# Patient Record
Sex: MALE | Race: BLACK OR AFRICAN AMERICAN | Employment: FULL TIME | URBAN - METROPOLITAN AREA
[De-identification: names, ages, dates, MRNs, and addresses within clinical notes are randomized per-mention and may not be internally consistent; named-entity substitution may affect disease eponyms.]

---

## 2017-06-09 ENCOUNTER — OFFICE VISIT (OUTPATIENT)
Dept: FAMILY MEDICINE CLINIC | Age: 52
End: 2017-06-09

## 2017-06-09 VITALS
DIASTOLIC BLOOD PRESSURE: 108 MMHG | HEIGHT: 67 IN | OXYGEN SATURATION: 98 % | TEMPERATURE: 98 F | RESPIRATION RATE: 18 BRPM | HEART RATE: 79 BPM | WEIGHT: 221.8 LBS | SYSTOLIC BLOOD PRESSURE: 178 MMHG | BODY MASS INDEX: 34.81 KG/M2

## 2017-06-09 DIAGNOSIS — E78.00 HYPERCHOLESTEROLEMIA: ICD-10-CM

## 2017-06-09 DIAGNOSIS — R06.83 SNORING: ICD-10-CM

## 2017-06-09 DIAGNOSIS — Z23 ENCOUNTER FOR IMMUNIZATION: ICD-10-CM

## 2017-06-09 DIAGNOSIS — I10 ESSENTIAL HYPERTENSION: Primary | ICD-10-CM

## 2017-06-09 PROBLEM — R73.01 IMPAIRED FASTING GLUCOSE: Status: ACTIVE | Noted: 2017-06-09

## 2017-06-09 RX ORDER — AMLODIPINE BESYLATE 5 MG/1
5 TABLET ORAL
Qty: 90 TAB | Refills: 4 | Status: SHIPPED | OUTPATIENT
Start: 2017-06-09 | End: 2018-10-06 | Stop reason: SDUPTHER

## 2017-06-09 RX ORDER — ATORVASTATIN CALCIUM 40 MG/1
40 TABLET, FILM COATED ORAL
Qty: 90 TAB | Refills: 4 | Status: SHIPPED | OUTPATIENT
Start: 2017-06-09 | End: 2018-10-06 | Stop reason: SDUPTHER

## 2017-06-09 RX ORDER — CARVEDILOL 25 MG/1
25 TABLET ORAL 2 TIMES DAILY WITH MEALS
Qty: 180 TAB | Refills: 4 | Status: SHIPPED | OUTPATIENT
Start: 2017-06-09 | End: 2018-10-06 | Stop reason: SDUPTHER

## 2017-06-09 RX ORDER — LOSARTAN POTASSIUM AND HYDROCHLOROTHIAZIDE 25; 100 MG/1; MG/1
1 TABLET ORAL DAILY
Qty: 90 TAB | Refills: 4 | Status: SHIPPED | OUTPATIENT
Start: 2017-06-09 | End: 2018-10-06 | Stop reason: SDUPTHER

## 2017-06-09 NOTE — PATIENT INSTRUCTIONS

## 2017-06-09 NOTE — MR AVS SNAPSHOT
Visit Information Date & Time Provider Department Dept. Phone Encounter #  
 6/9/2017  2:45 PM Alexandre Celaya, [address] [place] [address] [phone] [id number] Follow-up Instructions Return in about 4 weeks (around 7/7/2017) for physical, labs. Upcoming Health Maintenance Date Due FOBT Q 1 YEAR AGE 50-75 4/28/2016 INFLUENZA AGE 9 TO ADULT 8/1/2017 DTaP/Tdap/Td series (2 - Td) 4/22/2026 Allergies as of 6/9/2017  Review Complete On: 6/9/2017 By: Alexandre Celaya MD  
  
 Severity Noted Reaction Type Reactions Ace Inhibitors  02/22/2013    Cough ED Current Immunizations  Reviewed on 4/22/2016 Name Date Pneumococcal Polysaccharide (PPSV-23)  Incomplete Tdap 4/22/2016 Not reviewed this visit You Were Diagnosed With   
  
 Codes Comments Essential hypertension    -  Primary ICD-10-CM: I10 
ICD-9-CM: 401.9 Hypercholesterolemia     ICD-10-CM: E78.00 ICD-9-CM: 272.0 Cardiomyopathy, unspecified type     ICD-10-CM: I42.9 ICD-9-CM: 425.4 LBBB (left bundle branch block)     ICD-10-CM: I44.7 ICD-9-CM: 426.3 Encounter for immunization     ICD-10-CM: U28 ICD-9-CM: V03.89 Snoring     ICD-10-CM: R06.83 
ICD-9-CM: 786.09 Vitals BP Pulse Temp Resp Height(growth percentile) Weight(growth percentile) (!) 178/108 (BP 1 Location: Right arm, BP Patient Position: Sitting) 79 98 °F (36.7 °C) (Oral) 18 5' 7\" (1.702 m) 221 lb 12.8 oz (100.6 kg) SpO2 BMI Smoking Status 98% 34.74 kg/m2 Never Smoker Vitals History BMI and BSA Data Body Mass Index Body Surface Area 34.74 kg/m 2 2.18 m 2 Preferred Pharmacy Pharmacy Name Phone [facility] [facility], [address] [place] [address],[address] [phone] Your Updated Medication List  
  
   
This list is accurate as of: 6/9/17  3:50 PM.  Always use your most recent med list.  
  
  
  
  
 amLODIPine 5 mg tablet Commonly known as:  Judith Dural Take 1 Tab by mouth nightly. atorvastatin 40 mg tablet Commonly known as:  LIPITOR Take 1 Tab by mouth nightly. carvedilol 25 mg tablet Commonly known as:  Jeanella Meline Take 1 Tab by mouth two (2) times daily (with meals). losartan-hydroCHLOROthiazide 100-25 mg per tablet Commonly known as:  HYZAAR Take 1 Tab by mouth daily. multivitamin tablet Commonly known as:  ONE A DAY Take 1 Tab by mouth daily. Prescriptions Sent to Pharmacy Refills  
 amLODIPine (NORVASC) 5 mg tablet 4 Sig: Take 1 Tab by mouth nightly. Class: Normal  
 Pharmacy: [facility] [address] [place] [address] Ph #: [phone] Route: Oral  
 carvedilol (COREG) 25 mg tablet 4 Sig: Take 1 Tab by mouth two (2) times daily (with meals). Class: Normal  
 Pharmacy: [facility] [address] [place] [address] Ph #: [phone] Route: Oral  
 losartan-hydroCHLOROthiazide (HYZAAR) 100-25 mg per tablet 4 Sig: Take 1 Tab by mouth daily. Class: Normal  
 Pharmacy: [facility] [address] [place] [address] Ph #: [phone] Route: Oral  
 atorvastatin (LIPITOR) 40 mg tablet 4 Sig: Take 1 Tab by mouth nightly. Class: Normal  
 Pharmacy: [facility] [address] [place] [address] Ph #: [phone] Route: Oral  
  
We Performed the Following PNEUMOCOCCAL POLYSACCHARIDE VACCINE, 23-VALENT, ADULT OR IMMUNOSUPPRESSED PT DOSE, [43096 CPT(R)] REFERRAL TO SLEEP STUDIES [REF99 Custom] Comments:  
 Please evaluate patient for snoring, hypertension, would like him to have sleep study. Follow-up Instructions Return in about 4 weeks (around 7/7/2017) for physical, labs. Referral Information  Referral ID Referred By Referred To  
  
 [id number] Daniel Sanchez MD   
 [address] [place] [address] Janeen Melvin Phone: [phone] Fax: [phone] Visits Status Start Date End Date 1 New Request 6/9/17 6/9/18 If your referral has a status of pending review or denied, additional information will be sent to support the outcome of this decision. Patient Instructions DASH Diet: Care Instructions Your Care Instructions The DASH diet is an eating plan that can help lower your blood pressure. DASH stands for Dietary Approaches to Stop Hypertension. Hypertension is high blood pressure. The DASH diet focuses on eating foods that are high in calcium, potassium, and magnesium. These nutrients can lower blood pressure. The foods that are highest in these nutrients are fruits, vegetables, low-fat dairy products, nuts, seeds, and legumes. But taking calcium, potassium, and magnesium supplements instead of eating foods that are high in those nutrients does not have the same effect. The DASH diet also includes whole grains, fish, and poultry. The DASH diet is one of several lifestyle changes your doctor may recommend to lower your high blood pressure. Your doctor may also want you to decrease the amount of sodium in your diet. Lowering sodium while following the DASH diet can lower blood pressure even further than just the DASH diet alone. Follow-up care is a key part of your treatment and safety. Be sure to make and go to all appointments, and call your doctor if you are having problems. It's also a good idea to know your test results and keep a list of the medicines you take. How can you care for yourself at home? Following the DASH diet · Eat 4 to 5 servings of fruit each day. A serving is 1 medium-sized piece of fruit, ½ cup chopped or canned fruit, 1/4 cup dried fruit, or 4 ounces (½ cup) of fruit juice. Choose fruit more often than fruit juice. · Eat 4 to 5 servings of vegetables each day.  A serving is 1 cup of lettuce or raw leafy vegetables, ½ cup of chopped or cooked vegetables, or 4 ounces (½ cup) of vegetable juice. Choose vegetables more often than vegetable juice. · Get 2 to 3 servings of low-fat and fat-free dairy each day. A serving is 8 ounces of milk, 1 cup of yogurt, or 1 ½ ounces of cheese. · Eat 6 to 8 servings of grains each day. A serving is 1 slice of bread, 1 ounce of dry cereal, or ½ cup of cooked rice, pasta, or cooked cereal. Try to choose whole-grain products as much as possible. · Limit lean meat, poultry, and fish to 2 servings each day. A serving is 3 ounces, about the size of a deck of cards. · Eat 4 to 5 servings of nuts, seeds, and legumes (cooked dried beans, lentils, and split peas) each week. A serving is 1/3 cup of nuts, 2 tablespoons of seeds, or ½ cup of cooked beans or peas. · Limit fats and oils to 2 to 3 servings each day. A serving is 1 teaspoon of vegetable oil or 2 tablespoons of salad dressing. · Limit sweets and added sugars to 5 servings or less a week. A serving is 1 tablespoon jelly or jam, ½ cup sorbet, or 1 cup of lemonade. · Eat less than 2,300 milligrams (mg) of sodium a day. If you limit your sodium to 1,500 mg a day, you can lower your blood pressure even more. Tips for success · Start small. Do not try to make dramatic changes to your diet all at once. You might feel that you are missing out on your favorite foods and then be more likely to not follow the plan. Make small changes, and stick with them. Once those changes become habit, add a few more changes. · Try some of the following: ¨ Make it a goal to eat a fruit or vegetable at every meal and at snacks. This will make it easy to get the recommended amount of fruits and vegetables each day. ¨ Try yogurt topped with fruit and nuts for a snack or healthy dessert. ¨ Add lettuce, tomato, cucumber, and onion to sandwiches.  
¨ Combine a ready-made pizza crust with low-fat mozzarella cheese and lots of vegetable toppings. Try using tomatoes, squash, spinach, broccoli, carrots, cauliflower, and onions. ¨ Have a variety of cut-up vegetables with a low-fat dip as an appetizer instead of chips and dip. ¨ Sprinkle sunflower seeds or chopped almonds over salads. Or try adding chopped walnuts or almonds to cooked vegetables. ¨ Try some vegetarian meals using beans and peas. Add garbanzo or kidney beans to salads. Make burritos and tacos with mashed tripathi beans or black beans. Where can you learn more? Go to http://josuéUnidymmartine.info/. Enter D145 in the search box to learn more about \"DASH Diet: Care Instructions. \" Current as of: March 23, 2016 Content Version: 11.2 © 5739-1179 GRID. Care instructions adapted under license by [employer] (which disclaims liability or warranty for this information). If you have questions about a medical condition or this instruction, always ask your healthcare professional. [address] any warranty or liability for your use of this information. Introducing [facility]! Grace Olmos introduces [employer] patient portal. Now you can access parts of your medical record, email your doctor's office, and request medication refills online. 1. In your internet browser, go to [email or web address] 2. Click on the First Time User? Click Here link in the Sign In box. You will see the New Member Sign Up page. 3. Enter your Smart[employer] Access Code exactly as it appears below. You will not need to use this code after youve completed the sign-up process. If you do not sign up before the expiration date, you must request a new code. · Smart[employer] Access Code: [email or web address] Expires: 9/7/2017  3:48 PM 
 
4. Enter the last four digits of your Social Security Number (xxxx) and Date of Birth (mm/dd/yyyy) as indicated and click Submit. You will be taken to the next sign-up page. 5. Create a [employer] ID. This will be your [employer] login ID and cannot be changed, so think of one that is secure and easy to remember. 6. Create a [employer] password. You can change your password at any time. 7. Enter your Password Reset Question and Answer. This can be used at a later time if you forget your password. 8. Enter your e-mail address. You will receive e-mail notification when new information is available in [address] 9. Click Sign Up. You can now view and download portions of your medical record. 10. Click the Download Summary menu link to download a portable copy of your medical information. If you have questions, please visit the Frequently Asked Questions section of the [employer] website. Remember, [employer] is NOT to be used for urgent needs. For medical emergencies, dial 911. Now available from your iPhone and Android! Please provide this summary of care documentation to your next provider. Your primary care clinician is listed as Bryce Jama. If you have any questions after today's visit, please call [phone].

## 2017-06-09 NOTE — PROGRESS NOTES
Chief Complaint   Patient presents with    Hypertension     follow up, asking about need for going back on medication    Cholesterol Problem     follow up       Reviewed Record in preparation for visit and have obtained necessary documentation. Identified pt with two pt identifiers (Name @ )    Health Maintenance Due   Topic    Pneumococcal 19-64 Medium Risk (1 of 1 - PPSV23)    FOBT Q 1 YEAR AGE 50-75          1. Have you been to the ER, urgent care clinic since your last visit? Hospitalized since your last visit? No    2. Have you seen or consulted any other health care providers outside of the [address] [place] [address] since your last visit? Include any pap smears or colon screening.  No

## 2017-06-10 NOTE — PROGRESS NOTES
Elmer Milan [address] [facility], [address]  [phone]             Date of visit: 6/9/2017   Subjective:      History obtained from:  the patient. Juju Diez is a 47 y.o. male who presents today for feeling fine, just wants to get back on meds. , was on 3 bp pills plus pravastatin. Had CHF with dilated cardiomyopathy EF 20% several years ago when BP was high, but that resolved with treatment of blood pressure. Denies any cardiac symptoms now. Did try to get back into exercise and felt more short of breath, like he needed his meds. They ran out a few months ago. Has never been checked for ROGE. Admits that grandson has heard him snoring and that he sometimes has somnolence. Patient Active Problem List    Diagnosis Date Noted    Impaired fasting glucose 06/09/2017    Cardiomyopathy ([address] 03/18/2013    LBBB (left bundle branch block) 03/04/2013    Essential hypertension, malignant 03/04/2013    Hypertension     Hypercholesterolemia      Current Outpatient Prescriptions   Medication Sig Dispense Refill    amLODIPine (NORVASC) 5 mg tablet Take 1 Tab by mouth nightly. 90 Tab 4    carvedilol (COREG) 25 mg tablet Take 1 Tab by mouth two (2) times daily (with meals). 180 Tab 4    losartan-hydroCHLOROthiazide (HYZAAR) 100-25 mg per tablet Take 1 Tab by mouth daily. 90 Tab 4    atorvastatin (LIPITOR) 40 mg tablet Take 1 Tab by mouth nightly. 90 Tab 4    multivitamin (ONE A DAY) tablet Take 1 Tab by mouth daily.        Allergies   Allergen Reactions    Ace Inhibitors Cough     ED     Past Medical History:   Diagnosis Date    Hypercholesteremia     Hypertension     Obesity      Past Surgical History:   Procedure Laterality Date    HX TONSILLECTOMY       Family History   Problem Relation Age of Onset    Kidney Disease Mother     Hypertension Mother     Lung Disease Father      lung cancer    Hypertension Maternal Grandmother     Diabetes Maternal Grandmother Social History   Substance Use Topics    Smoking status: Never Smoker    Smokeless tobacco: Former User     Types: Chew     Quit date: 1/1/2016    Alcohol use Yes      Comment: occasional-twice a month      Social History     Social History Narrative    ** Merged History Encounter **             Review of Systems  Card: denies chest pain or palpitations  Pulm: denies shortness of breath other than mild during new exercise      Objective:     Vitals:    06/09/17 1511 06/09/17 1520   BP: (!) 180/104 (!) 178/108   Pulse: 79    Resp: 18    Temp: 98 °F (36.7 °C)    TempSrc: Oral    SpO2: 98%    Weight: 221 lb 12.8 oz (100.6 kg)    Height: 5' 7\" (1.702 m)      Body mass index is 34.74 kg/(m^2). General: stated age, obese and in NAD  Neck: supple, symmetrical, trachea midline, no adenopathy and thyroid: not enlarged, symmetric, no tenderness/mass/nodules  Lungs:  clear to auscultation w/o rales, rhonchi, wheezes w/normal effort and no use of accessory muscles of respiration   Heart: regular rate and rhythm, S1, S2 normal, no murmur, click, rub or gallop  Abdomen: soft, nontender, no masses, BS normal  Ext:  No edema noted.    Lymph: no cervical adenopathy appreciated  Skin:  Normal. and no rash or abnormalities   Psych: alert and oriented to person, place, time and situation and Speech: appropriate quality, quantity and organization of sentences     Lab Results   Component Value Date/Time    Cholesterol, total 316 05/16/2014 09:10 AM    HDL Cholesterol 54 05/16/2014 09:10 AM    LDL, calculated 191 05/16/2014 09:10 AM    VLDL, calculated 71 05/16/2014 09:10 AM    Triglyceride 353 05/16/2014 09:10 AM     Lab Results   Component Value Date/Time    Sodium 140 05/16/2014 09:10 AM    Potassium 3.7 05/16/2014 09:10 AM    Chloride 101 05/16/2014 09:10 AM    CO2 26 05/16/2014 09:10 AM    Anion gap 9 11/25/2011 06:50 PM    Glucose 115 05/16/2014 09:10 AM    BUN 10 05/16/2014 09:10 AM    Creatinine 0.85 05/16/2014 09:10 AM BUN/Creatinine ratio 12 05/16/2014 09:10 AM    GFR est  05/16/2014 09:10 AM    GFR est non- 05/16/2014 09:10 AM    Calcium 9.2 05/16/2014 09:10 AM    Bilirubin, total 0.3 05/16/2014 09:10 AM    AST (SGOT) 18 05/16/2014 09:10 AM    Alk. phosphatase 43 05/16/2014 09:10 AM    Protein, total 6.8 05/16/2014 09:10 AM    Albumin 4.0 05/16/2014 09:10 AM    Globulin 4.5 11/25/2011 06:50 PM    A-G Ratio 1.4 05/16/2014 09:10 AM    ALT (SGPT) 14 05/16/2014 09:10 AM     Lab Results   Component Value Date/Time    WBC 5.1 05/16/2014 09:10 AM    HGB 13.6 05/16/2014 09:10 AM    HCT 39.4 05/16/2014 09:10 AM    PLATELET 177 32/83/9130 09:10 AM    MCV 84 05/16/2014 09:10 AM     Lab Results   Component Value Date/Time    TSH 2.820 05/16/2014 09:10 AM    TSH 1.520 02/22/2013 12:01 PM         Assessment/Plan:       ICD-10-CM ICD-9-CM    1. Essential hypertension I10 401.9 amLODIPine (NORVASC) 5 mg tablet      carvedilol (COREG) 25 mg tablet      losartan-hydroCHLOROthiazide (HYZAAR) 100-25 mg per tablet      REFERRAL TO SLEEP STUDIES   2. Hypercholesterolemia E78.00 272.0    3. Snoring R06.83 786.09 REFERRAL TO SLEEP STUDIES   4.  Encounter for immunization Z23 V03.89 PNEUMOCOCCAL POLYSACCHARIDE VACCINE, 23-VALENT, ADULT OR IMMUNOSUPPRESSED PT DOSE,       Orders Placed This Encounter    Pneumococcal polysaccharide vaccine, 23-valent, adult or immunosuppressed pt dose    REFERRAL TO SLEEP STUDIES    amLODIPine (NORVASC) 5 mg tablet    carvedilol (COREG) 25 mg tablet    losartan-hydroCHLOROthiazide (HYZAAR) 100-25 mg per tablet    atorvastatin (LIPITOR) 40 mg tablet     Get back on bp meds  Told him ok to do gradually, however he has started them all at once before without side effects    Advised changing pravastatin to lipitor for most cardiac protection    Refer sleep study as I think ROGE is a possibility and he is willing to get tested    encoruaged regular follow up; don't want him to ever run out of his meds again  He wants to delay labs and would probably best to do them when on his meds anyway, asked him to come in 1 mo for CPE    Discussed the diagnosis and plan and he expressed understanding. Follow-up Disposition:  Return in about 4 weeks (around 7/7/2017) for physical, labs.     Alexandre Celaya MD

## 2018-10-24 ENCOUNTER — OFFICE VISIT (OUTPATIENT)
Dept: FAMILY MEDICINE CLINIC | Age: 53
End: 2018-10-24

## 2018-10-24 VITALS
BODY MASS INDEX: 35.53 KG/M2 | RESPIRATION RATE: 16 BRPM | WEIGHT: 226.4 LBS | HEIGHT: 67 IN | DIASTOLIC BLOOD PRESSURE: 120 MMHG | SYSTOLIC BLOOD PRESSURE: 186 MMHG | TEMPERATURE: 98.2 F | OXYGEN SATURATION: 97 % | HEART RATE: 80 BPM

## 2018-10-24 DIAGNOSIS — E78.5 HYPERLIPIDEMIA LDL GOAL <130: ICD-10-CM

## 2018-10-24 DIAGNOSIS — I10 ESSENTIAL HYPERTENSION: Primary | ICD-10-CM

## 2018-10-24 DIAGNOSIS — Z12.11 COLON CANCER SCREENING: ICD-10-CM

## 2018-10-24 PROBLEM — E66.01 SEVERE OBESITY (HCC): Status: ACTIVE | Noted: 2018-10-24

## 2018-10-24 PROBLEM — R79.89 ABNORMAL CBC: Status: ACTIVE | Noted: 2018-10-24

## 2018-10-24 RX ORDER — ASPIRIN 81 MG/1
81 TABLET ORAL DAILY
Qty: 30 TAB | Refills: 5 | Status: SHIPPED | OUTPATIENT
Start: 2018-10-24

## 2018-10-24 NOTE — LETTER
10/26/2018 7:03 AM 
 
Mr. Madelaine Estevez [address]  [phone] [place] [address] Dear Madelaine Estevez: 
 
Please find your most recent results below. Resulted Orders METABOLIC PANEL, COMPREHENSIVE Result Value Ref Range Glucose 103 (H) 65 - 99 mg/dL BUN 11 6 - 24 mg/dL Creatinine 1.02 0.76 - 1.27 mg/dL GFR est non-AA 84 >59 mL/min/1.73 GFR est AA 97 >59 mL/min/1.73  
 BUN/Creatinine ratio 11 9 - 20 Sodium 141 134 - 144 mmol/L Potassium 4.0 3.5 - 5.2 mmol/L Chloride 99 96 - 106 mmol/L  
 CO2 25 20 - 29 mmol/L Calcium 9.5 8.7 - 10.2 mg/dL Protein, total 7.7 6.0 - 8.5 g/dL Albumin 4.6 3.5 - 5.5 g/dL GLOBULIN, TOTAL 3.1 1.5 - 4.5 g/dL A-G Ratio 1.5 1.2 - 2.2 Bilirubin, total 0.5 0.0 - 1.2 mg/dL Alk. phosphatase 54 39 - 117 IU/L  
 AST (SGOT) 22 0 - 40 IU/L  
 ALT (SGPT) 22 0 - 44 IU/L Narrative Performed at:  [address]  [id number] : Dinorah Lamb MD, Phone:  [phone] LIPID PANEL Result Value Ref Range Cholesterol, total 391 (H) 100 - 199 mg/dL Triglyceride 223 (H) 0 - 149 mg/dL HDL Cholesterol 64 >39 mg/dL VLDL, calculated 45 (H) 5 - 40 mg/dL LDL, calculated 282 (H) 0 - 99 mg/dL Comment Comment Comment:  
   Possible Familial Hypercholesterolemia. FH should be suspected when 
fasting LDL cholesterol is above 189 mg/dL or non-HDL cholesterol 
is above 219 mg/dL. A family history of high cholesterol and heart 
disease in 1st degree relatives should be collected. J Clin Lipidol 8815;7:757-962 Narrative Performed at:  [address]  [id number] : Dinorah Lamb MD, Phone:  [phone] CBC W/O DIFF Result Value Ref Range WBC 6.0 3.4 - 10.8 x10E3/uL  
 RBC 5.23 4. 14 - 5.80 x10E6/uL HGB 15.0 13.0 - 17.7 g/dL HCT 44.3 37.5 - 51.0 %  MCV 85 79 - 97 fL  
 MCH 28.7 26.6 - 33.0 pg  
 MCHC 33.9 31.5 - 35.7 g/dL  
 RDW 14.6 12.3 - 15.4 % PLATELET 740 931 - 801 x10E3/uL Narrative Performed at:  [address]  [id number] : Henry Viera MD, Phone:  [phone] CVD REPORT Result Value Ref Range INTERPRETATION Note Comment:  
   Supplemental report is available. Narrative Performed at:  [address] [place] [address] [address] [place] [address]  [id number] : Edgard Fournier MD, Phone:  [phone] Your cholesterol is very high RECOMMENDATIONS: 
Please resume taking your Lipitor 40 mg at night Watch your diet and exercise Follow up in three months for fasting office visit and blood work Please call me if you have any questions: [phone] Sincerely, Regi Catalan NP

## 2018-10-24 NOTE — PROGRESS NOTES
HISTORY OF PRESENT ILLNESS  Fanny Aguilar is a 47 y.o. male. HPI: Cardiovascular Review  The patient has hypertension, hyperlipidemia and obesity. He reports not taking medications regularly as instructed. his blood pressure is elevated today, reports that ran out of all of his medications two weeks ago. Diet and Lifestyle: not attempting to follow a low fat, low cholesterol diet, not attempting to follow a low sodium diet, no formal exercise but active during the day. BMI: 35.46: he is not watching his diet, he is not exercising  Due for colonoscopy  Past Medical History:   Diagnosis Date    Hypercholesteremia     Hypertension     Obesity      Past Surgical History:   Procedure Laterality Date    HX TONSILLECTOMY       Allergies   Allergen Reactions    Ace Inhibitors Cough     ED     Current Outpatient Medications:     aspirin delayed-release 81 mg tablet, Take 1 Tab by mouth daily. , Disp: 30 Tab, Rfl: 5    atorvastatin (LIPITOR) 40 mg tablet, TAKE ONE TABLET BY MOUTH NIGHTLY, Disp: 30 Tab, Rfl: 0    carvedilol (COREG) 25 mg tablet, TAKE ONE TABLET BY MOUTH TWICE DAILY WITH MEALS, Disp: 60 Tab, Rfl: 0    losartan-hydroCHLOROthiazide (HYZAAR) 100-25 mg per tablet, TAKE ONE TABLET BY MOUTH ONCE DAILY, Disp: 30 Tab, Rfl: 0    amLODIPine (NORVASC) 5 mg tablet, Take 1 Tab by mouth nightly. NEEDS APPT, Disp: 30 Tab, Rfl: 0    multivitamin (ONE A DAY) tablet, Take 1 Tab by mouth daily. , Disp: , Rfl:   Review of Systems   Constitutional: Negative. Respiratory: Negative. Cardiovascular: Negative. Gastrointestinal: Negative. Blood pressure (!) 186/120, pulse 80, temperature 98.2 °F (36.8 °C), temperature source Oral, resp. rate 16, height 5' 7\" (1.702 m), weight 226 lb 6.4 oz (102.7 kg), SpO2 97 %. Body mass index is 35.46 kg/m². Physical Exam   Constitutional: No distress. HENT:   Mouth/Throat: Oropharynx is clear and moist.   Neck: Normal range of motion. Neck supple.    Cardiovascular: Normal rate and regular rhythm. No murmur heard. Pulmonary/Chest: Effort normal and breath sounds normal.   Abdominal: Soft. Bowel sounds are normal.   Skin: Skin is warm and dry. Nursing note and vitals reviewed. ASSESSMENT and PLAN  Diagnoses and all orders for this visit:    1. Essential hypertension not controlled   -     METABOLIC PANEL, COMPREHENSIVE  -     CBC W/O DIFF    2. Colon cancer screening  -     REFERRAL TO GASTROENTEROLOGY    3. Hyperlipidemia LDL goal <130  -     LIPID PANEL    4. BMI 35.0-35.9,adult      Normal BMI discussed,advised to watch diet and exercise  Other orders  -     aspirin delayed-release 81 mg tablet; Take 1 Tab by mouth daily.   Patient advised to  his medication and go home for the rest the day  Follow up in 2 weeks, he would like to come on Thursday and thus made appointment to see DR Rowan Kenny to recheck his blood pressure  Pt was given an after visit summary which includes diagnosis, current medicines and vital and voiced understanding of treatment plan

## 2018-10-24 NOTE — PROGRESS NOTES
Chief Complaint   Patient presents with    Hypertension     Follow up.  Abdominal Pain     For about 1 week, off and on     1. Have you been to the ER, urgent care clinic since your last visit? Hospitalized since your last visit? No    2. Have you seen or consulted any other health care providers outside of the [address] [place] [address] since your last visit? Include any pap smears or colon screening.  No

## 2018-10-25 LAB
ALBUMIN SERPL-MCNC: 4.6 G/DL (ref 3.5–5.5)
ALBUMIN/GLOB SERPL: 1.5 {RATIO} (ref 1.2–2.2)
ALP SERPL-CCNC: 54 IU/L (ref 39–117)
ALT SERPL-CCNC: 22 IU/L (ref 0–44)
AST SERPL-CCNC: 22 IU/L (ref 0–40)
BILIRUB SERPL-MCNC: 0.5 MG/DL (ref 0–1.2)
BUN SERPL-MCNC: 11 MG/DL (ref 6–24)
BUN/CREAT SERPL: 11 (ref 9–20)
CALCIUM SERPL-MCNC: 9.5 MG/DL (ref 8.7–10.2)
CHLORIDE SERPL-SCNC: 99 MMOL/L (ref 96–106)
CHOLEST SERPL-MCNC: 391 MG/DL (ref 100–199)
CO2 SERPL-SCNC: 25 MMOL/L (ref 20–29)
COMMENT, 011824: ABNORMAL
CREAT SERPL-MCNC: 1.02 MG/DL (ref 0.76–1.27)
ERYTHROCYTE [DISTWIDTH] IN BLOOD BY AUTOMATED COUNT: 14.6 % (ref 12.3–15.4)
GLOBULIN SER CALC-MCNC: 3.1 G/DL (ref 1.5–4.5)
GLUCOSE SERPL-MCNC: 103 MG/DL (ref 65–99)
HCT VFR BLD AUTO: 44.3 % (ref 37.5–51)
HDLC SERPL-MCNC: 64 MG/DL
HGB BLD-MCNC: 15 G/DL (ref 13–17.7)
INTERPRETATION, 910389: NORMAL
LDLC SERPL CALC-MCNC: 282 MG/DL (ref 0–99)
MCH RBC QN AUTO: 28.7 PG (ref 26.6–33)
MCHC RBC AUTO-ENTMCNC: 33.9 G/DL (ref 31.5–35.7)
MCV RBC AUTO: 85 FL (ref 79–97)
PLATELET # BLD AUTO: 243 X10E3/UL (ref 150–379)
POTASSIUM SERPL-SCNC: 4 MMOL/L (ref 3.5–5.2)
PROT SERPL-MCNC: 7.7 G/DL (ref 6–8.5)
RBC # BLD AUTO: 5.23 X10E6/UL (ref 4.14–5.8)
SODIUM SERPL-SCNC: 141 MMOL/L (ref 134–144)
TRIGL SERPL-MCNC: 223 MG/DL (ref 0–149)
VLDLC SERPL CALC-MCNC: 45 MG/DL (ref 5–40)
WBC # BLD AUTO: 6 X10E3/UL (ref 3.4–10.8)

## 2018-11-15 ENCOUNTER — OFFICE VISIT (OUTPATIENT)
Dept: FAMILY MEDICINE CLINIC | Age: 53
End: 2018-11-15

## 2018-11-15 VITALS
SYSTOLIC BLOOD PRESSURE: 160 MMHG | HEART RATE: 89 BPM | HEIGHT: 67 IN | RESPIRATION RATE: 16 BRPM | BODY MASS INDEX: 36.13 KG/M2 | DIASTOLIC BLOOD PRESSURE: 96 MMHG | OXYGEN SATURATION: 95 % | TEMPERATURE: 97.4 F | WEIGHT: 230.2 LBS

## 2018-11-15 DIAGNOSIS — E66.01 SEVERE OBESITY (HCC): ICD-10-CM

## 2018-11-15 DIAGNOSIS — I10 ESSENTIAL HYPERTENSION: ICD-10-CM

## 2018-11-15 DIAGNOSIS — I10 ESSENTIAL HYPERTENSION, MALIGNANT: Primary | ICD-10-CM

## 2018-11-15 DIAGNOSIS — I42.9 CARDIOMYOPATHY, UNSPECIFIED TYPE (HCC): ICD-10-CM

## 2018-11-15 DIAGNOSIS — E78.00 HYPERCHOLESTEROLEMIA: ICD-10-CM

## 2018-11-15 DIAGNOSIS — R73.01 IMPAIRED FASTING GLUCOSE: ICD-10-CM

## 2018-11-15 RX ORDER — AMLODIPINE BESYLATE 10 MG/1
10 TABLET ORAL DAILY
Qty: 90 TAB | Refills: 1 | Status: SHIPPED | OUTPATIENT
Start: 2018-11-15 | End: 2019-05-17 | Stop reason: SDUPTHER

## 2018-11-15 RX ORDER — LOSARTAN POTASSIUM AND HYDROCHLOROTHIAZIDE 25; 100 MG/1; MG/1
TABLET ORAL
Qty: 90 TAB | Refills: 1 | Status: SHIPPED | OUTPATIENT
Start: 2018-11-15 | End: 2019-05-17 | Stop reason: SDUPTHER

## 2018-11-15 RX ORDER — ATORVASTATIN CALCIUM 40 MG/1
TABLET, FILM COATED ORAL
Qty: 90 TAB | Refills: 1 | Status: SHIPPED | OUTPATIENT
Start: 2018-11-15 | End: 2019-05-17 | Stop reason: SDUPTHER

## 2018-11-15 RX ORDER — CARVEDILOL 25 MG/1
TABLET ORAL
Qty: 180 TAB | Refills: 1 | Status: SHIPPED | OUTPATIENT
Start: 2018-11-15 | End: 2019-05-17 | Stop reason: SDUPTHER

## 2018-11-15 NOTE — PROGRESS NOTES
Identified pt with two pt identifiers(name and ). Reviewed record in preparation for visit and have obtained necessary documentation. Chief Complaint Patient presents with  Hypertension  
  follow up Health Maintenance Due Topic  Shingrix Vaccine Age 50> (1 of 2)  FOBT Q 1 YEAR AGE 50-75 Visit Vitals BP (!) 160/96 (BP 1 Location: Left arm, BP Patient Position: Sitting) Pulse 89 Temp 97.4 °F (36.3 °C) (Oral) Resp 16 Ht 5' 7\" (1.702 m) Wt 230 lb 3.2 oz (104.4 kg) SpO2 95% BMI 36.05 kg/m² Coordination of Care Questionnaire: 
:  
1) Have you been to an emergency room, urgent care, or hospitalized since your last visit?   no If yes, where when, and reason for visit? 2. Have seen or consulted any other health care provider since your last visit? NO If yes, where when, and reason for visit? 3) Do you have an Advanced Directive/ Living Will in place? NO If yes, do we have a copy on file NO If no, would you like information NO Patient is accompanied by self I have received verbal consent from Fanny Aguilar to discuss any/all medical information while they are present in the room.

## 2018-11-15 NOTE — LETTER
11/17/2018 11:17 AM 
 
Mr. Jojo Brennan [address] [place] [address] Dear Jojo Brennan: 
 
Please find your most recent results below. Resulted Orders METABOLIC PANEL, BASIC Result Value Ref Range Glucose 105 (H) 65 - 99 mg/dL BUN 13 6 - 24 mg/dL Creatinine 0.83 0.76 - 1.27 mg/dL GFR est non- >59 mL/min/1.73 GFR est  >59 mL/min/1.73  
 BUN/Creatinine ratio 16 9 - 20 Sodium 142 134 - 144 mmol/L Potassium 3.5 3.5 - 5.2 mmol/L Chloride 98 96 - 106 mmol/L  
 CO2 27 20 - 29 mmol/L Calcium 9.3 8.7 - 10.2 mg/dL LIPID PANEL Result Value Ref Range Cholesterol, total 239 (H) 100 - 199 mg/dL Triglyceride 380 (H) 0 - 149 mg/dL HDL Cholesterol 48 >39 mg/dL VLDL, calculated 76 (H) 5 - 40 mg/dL LDL, calculated 115 (H) 0 - 99 mg/dL RECOMMENDATIONS: 
Kidney, sugar, and electrolytes were fine. Cholesterol is MUCH improved since you resumed your medication. Please keep me posted about your blood pressures! Please call me if you have any questions: [phone] Sincerely, Rajiv Michelle MD

## 2018-11-15 NOTE — PATIENT INSTRUCTIONS
DASH Diet: Care Instructions Your Care Instructions The DASH diet is an eating plan that can help lower your blood pressure. DASH stands for Dietary Approaches to Stop Hypertension. Hypertension is high blood pressure. The DASH diet focuses on eating foods that are high in calcium, potassium, and magnesium. These nutrients can lower blood pressure. The foods that are highest in these nutrients are fruits, vegetables, low-fat dairy products, nuts, seeds, and legumes. But taking calcium, potassium, and magnesium supplements instead of eating foods that are high in those nutrients does not have the same effect. The DASH diet also includes whole grains, fish, and poultry. The DASH diet is one of several lifestyle changes your doctor may recommend to lower your high blood pressure. Your doctor may also want you to decrease the amount of sodium in your diet. Lowering sodium while following the DASH diet can lower blood pressure even further than just the DASH diet alone. Follow-up care is a key part of your treatment and safety. Be sure to make and go to all appointments, and call your doctor if you are having problems. It's also a good idea to know your test results and keep a list of the medicines you take. How can you care for yourself at home? Following the DASH diet · Eat 4 to 5 servings of fruit each day. A serving is 1 medium-sized piece of fruit, ½ cup chopped or canned fruit, 1/4 cup dried fruit, or 4 ounces (½ cup) of fruit juice. Choose fruit more often than fruit juice. · Eat 4 to 5 servings of vegetables each day. A serving is 1 cup of lettuce or raw leafy vegetables, ½ cup of chopped or cooked vegetables, or 4 ounces (½ cup) of vegetable juice. Choose vegetables more often than vegetable juice. · Get 2 to 3 servings of low-fat and fat-free dairy each day. A serving is 8 ounces of milk, 1 cup of yogurt, or 1 ½ ounces of cheese. · Eat 6 to 8 servings of grains each day. A serving is 1 slice of bread, 1 ounce of dry cereal, or ½ cup of cooked rice, pasta, or cooked cereal. Try to choose whole-grain products as much as possible. · Limit lean meat, poultry, and fish to 2 servings each day. A serving is 3 ounces, about the size of a deck of cards. · Eat 4 to 5 servings of nuts, seeds, and legumes (cooked dried beans, lentils, and split peas) each week. A serving is 1/3 cup of nuts, 2 tablespoons of seeds, or ½ cup of cooked beans or peas. · Limit fats and oils to 2 to 3 servings each day. A serving is 1 teaspoon of vegetable oil or 2 tablespoons of salad dressing. · Limit sweets and added sugars to 5 servings or less a week. A serving is 1 tablespoon jelly or jam, ½ cup sorbet, or 1 cup of lemonade. · Eat less than 2,300 milligrams (mg) of sodium a day. If you limit your sodium to 1,500 mg a day, you can lower your blood pressure even more. Tips for success · Start small. Do not try to make dramatic changes to your diet all at once. You might feel that you are missing out on your favorite foods and then be more likely to not follow the plan. Make small changes, and stick with them. Once those changes become habit, add a few more changes. · Try some of the following: ? Make it a goal to eat a fruit or vegetable at every meal and at snacks. This will make it easy to get the recommended amount of fruits and vegetables each day. ? Try yogurt topped with fruit and nuts for a snack or healthy dessert. ? Add lettuce, tomato, cucumber, and onion to sandwiches. ? Combine a ready-made pizza crust with low-fat mozzarella cheese and lots of vegetable toppings. Try using tomatoes, squash, spinach, broccoli, carrots, cauliflower, and onions. ? Have a variety of cut-up vegetables with a low-fat dip as an appetizer instead of chips and dip. ? Sprinkle sunflower seeds or chopped almonds over salads.  Or try adding chopped walnuts or almonds to cooked vegetables. ? Try some vegetarian meals using beans and peas. Add garbanzo or kidney beans to salads. Make burritos and tacos with mashed tripathi beans or black beans. Where can you learn more? Go to http://josué-martine.info/. Enter Q266 in the search box to learn more about \"DASH Diet: Care Instructions. \" Current as of: December 6, 2017 Content Version: 11.8 © 5058-1593 [employer]. Care instructions adapted under license by [employer] (which disclaims liability or warranty for this information). If you have questions about a medical condition or this instruction, always ask your healthcare professional. [address] any warranty or liability for your use of this information.

## 2018-11-15 NOTE — PROGRESS NOTES
[address] [place], [address] [place] [address] Road 
[phone] Date of visit: 11/15/2018 Subjective:  
  
History obtained from:  the patient. Ethel Mckeon is a 47 y.o. male who presents today for follow up blood pressure and cholesterol Resumed his meds 3 weeks ago after running out Taking them daily without problem Feeling better in general 
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 Sort of physical, using [employer] He was also referred for colonoscopy, says his wife is helping get that scheduled Patient Active Problem List  
 Diagnosis Date Noted  Severe obesity ([facility] [address] 10/24/2018  Abnormal CBC 10/24/2018  Impaired fasting glucose 06/09/2017  Cardiomyopathy ([facility] [address] 03/18/2013  LBBB (left bundle branch block) 03/04/2013  Essential hypertension, malignant 03/04/2013  Hypertension  Hypercholesterolemia Current Outpatient Medications Medication Sig Dispense Refill  amLODIPine (NORVASC) 10 mg tablet Take 1 Tab by mouth daily. Increased dose 90 Tab 1  
 atorvastatin (LIPITOR) 40 mg tablet TAKE ONE TABLET BY MOUTH NIGHTLY 90 Tab 1  carvedilol (COREG) 25 mg tablet TAKE ONE TABLET BY MOUTH TWICE DAILY WITH MEALS 180 Tab 1  
 losartan-hydroCHLOROthiazide (HYZAAR) 100-25 mg per tablet TAKE ONE TABLET BY MOUTH ONCE DAILY 90 Tab 1  
 aspirin delayed-release 81 mg tablet Take 1 Tab by mouth daily. 30 Tab 5 Allergies Allergen Reactions  Ace Inhibitors Cough ED Past Medical History:  
Diagnosis Date  Hypercholesteremia  Hypertension  Obesity Past Surgical History:  
Procedure Laterality Date  HX TONSILLECTOMY Family History Problem Relation Age of Onset  Kidney Disease Mother  Hypertension Mother  Lung Disease Father   
     lung cancer  Hypertension Maternal Grandmother  Diabetes Maternal Grandmother Social History Tobacco Use  Smoking status: Never Smoker  Smokeless tobacco: Former User Types: Chew Substance Use Topics  Alcohol use: Yes Comment: occasional-twice a month Social History Social History Narrative Drives [employer]  
  Review of Systems Card: denies chest pain Pulm: denies shortness of breath Objective:  
 
Vitals:  
 11/15/18 1403 BP: (!) 160/96 Pulse: 89 Resp: 16 Temp: 97.4 °F (36.3 °C) TempSrc: Oral  
SpO2: 95% Weight: 230 lb 3.2 oz (104.4 kg) Height: 5' 7\" (1.702 m) Body mass index is 36.05 kg/m². General: stated age, obese and in NAD Lungs:  clear to auscultation w/o rales, rhonchi, wheezes w/normal effort and no use of accessory muscles of respiration Heart: regular rate and rhythm, S1, S2 normal, no murmur, click, rub or gallop Ext:  No edema noted. Skin:  Normal. and no rash or abnormalities Psych: alert and oriented to person, place, time and situation and Speech: appropriate quality, quantity and organization of sentences Assessment/Plan: ICD-10-CM ICD-9-CM 1. Essential hypertension, malignant Z71 322.7 METABOLIC PANEL, BASIC 2. Hypercholesterolemia E78.00 272.0 LIPID PANEL 3. Severe obesity ([facility] [address] E66.01 278.01   
4. Cardiomyopathy, unspecified type ([facility] [address] I42.9 425.4 5. Impaired fasting glucose R73.01 790.21   
6. Essential hypertension I10 401.9 carvedilol (COREG) 25 mg tablet  
   losartan-hydroCHLOROthiazide (HYZAAR) 100-25 mg per tablet Orders Placed This Encounter  METABOLIC PANEL, BASIC  
 LIPID PANEL  
  amLODIPine (NORVASC) 10 mg tablet  atorvastatin (LIPITOR) 40 mg tablet  carvedilol (COREG) 25 mg tablet  losartan-hydroCHLOROthiazide (HYZAAR) 100-25 mg per tablet  
 
 
bp not quite controlled, raise norvasc 
encoruaged him to keep me updated on BP's History of cardiomyopathy but last echo ok and no symptoms Recheck labs back on meds Encouraged getting back into exercise, he plans to use treadmill Encouraged to cut back on juice, diet soda, try water, unsweet tea, seltzers, more veggies, he is giong to work on weight loss Discussed the diagnosis and plan and he expressed understanding. Follow-up Disposition: 
Return in about 4 weeks (around 12/13/2018) for Follow up, labs.  
 
Millicent Pinto MD

## 2018-11-16 LAB
BUN SERPL-MCNC: 13 MG/DL (ref 6–24)
BUN/CREAT SERPL: 16 (ref 9–20)
CALCIUM SERPL-MCNC: 9.3 MG/DL (ref 8.7–10.2)
CHLORIDE SERPL-SCNC: 98 MMOL/L (ref 96–106)
CHOLEST SERPL-MCNC: 239 MG/DL (ref 100–199)
CO2 SERPL-SCNC: 27 MMOL/L (ref 20–29)
CREAT SERPL-MCNC: 0.83 MG/DL (ref 0.76–1.27)
GLUCOSE SERPL-MCNC: 105 MG/DL (ref 65–99)
HDLC SERPL-MCNC: 48 MG/DL
INTERPRETATION, 910389: NORMAL
LDLC SERPL CALC-MCNC: 115 MG/DL (ref 0–99)
POTASSIUM SERPL-SCNC: 3.5 MMOL/L (ref 3.5–5.2)
SODIUM SERPL-SCNC: 142 MMOL/L (ref 134–144)
TRIGL SERPL-MCNC: 380 MG/DL (ref 0–149)
VLDLC SERPL CALC-MCNC: 76 MG/DL (ref 5–40)

## 2018-11-17 NOTE — PROGRESS NOTES
Sent in letter: 
Kidney, sugar, and electrolytes were fine. Cholesterol is MUCH improved since you resumed your medication. Please keep me posted about your blood pressures!

## 2019-06-07 ENCOUNTER — OFFICE VISIT (OUTPATIENT)
Dept: FAMILY MEDICINE CLINIC | Age: 54
End: 2019-06-07

## 2019-06-07 VITALS
HEIGHT: 67 IN | TEMPERATURE: 98.8 F | SYSTOLIC BLOOD PRESSURE: 154 MMHG | OXYGEN SATURATION: 95 % | WEIGHT: 224.6 LBS | HEART RATE: 82 BPM | RESPIRATION RATE: 18 BRPM | DIASTOLIC BLOOD PRESSURE: 95 MMHG | BODY MASS INDEX: 35.25 KG/M2

## 2019-06-07 DIAGNOSIS — E78.00 HYPERCHOLESTEROLEMIA: ICD-10-CM

## 2019-06-07 DIAGNOSIS — E66.01 SEVERE OBESITY (HCC): ICD-10-CM

## 2019-06-07 DIAGNOSIS — R73.01 IMPAIRED FASTING GLUCOSE: ICD-10-CM

## 2019-06-07 DIAGNOSIS — R40.0 DAYTIME SOMNOLENCE: ICD-10-CM

## 2019-06-07 DIAGNOSIS — I10 ACCELERATED HYPERTENSION: ICD-10-CM

## 2019-06-07 DIAGNOSIS — R39.11 URINARY HESITANCY: ICD-10-CM

## 2019-06-07 DIAGNOSIS — Z00.00 ENCOUNTER FOR PREVENTIVE CARE: Primary | ICD-10-CM

## 2019-06-07 DIAGNOSIS — R60.0 EDEMA OF BOTH LOWER EXTREMITIES: ICD-10-CM

## 2019-06-07 DIAGNOSIS — R35.0 URINARY FREQUENCY: ICD-10-CM

## 2019-06-07 DIAGNOSIS — I42.9 CARDIOMYOPATHY, UNSPECIFIED TYPE (HCC): ICD-10-CM

## 2019-06-07 DIAGNOSIS — R06.83 SNORING: ICD-10-CM

## 2019-06-07 PROBLEM — R79.89 ABNORMAL CBC: Status: RESOLVED | Noted: 2018-10-24 | Resolved: 2019-06-07

## 2019-06-07 RX ORDER — SPIRONOLACTONE 50 MG/1
50 TABLET, FILM COATED ORAL DAILY
Qty: 90 TAB | Refills: 0 | Status: SHIPPED | OUTPATIENT
Start: 2019-06-07 | End: 2019-08-23 | Stop reason: SDUPTHER

## 2019-06-07 NOTE — PROGRESS NOTES
Chief Complaint   Patient presents with    Hypertension     follow up    Cholesterol Problem     follow up       1. Have you been to the ER, urgent care clinic since your last visit? Hospitalized since your last visit? No    2. Have you seen or consulted any other health care providers outside of the [address] [place] [address] since your last visit? Include any pap smears or colon screening.  No

## 2019-06-07 NOTE — PROGRESS NOTES
Elmer Milan [facility]  [address], [address]  [phone]             Date of visit: 6/7/19   Subjective:      History obtained from:  the patient. Jamie Gallego is a 49 y.o. male who presents today for f/u chronic problems, also needs preventive care  Feeling well  Weight is down a little  Cut back on sugary drinks  Drinking water or seltzer  Joining gym this week  Taking a MVI    Hasn't had colonoscopy yet    Swelling in feet at end of day left more than right  Partially goes down overnight but not all the way  History of cardiomyopathy, dilated with ER as low as 20% years ago thought due to HTN  Most recent echo 5 years ago with normal EF and mild left ventriculomegaly but not dilated    Has noticed increase in urinary frequency  For 30 seconds no pain, discharge or burning  In am maybe a little hard to get stream started  Denies incontinence, urgency, dribbling    Only drinking 1 20oz bottle water during work day due to frequent urination  bm every 1-2 days, no problem    Sometimes left knee and ankle hurt he thinks from walking on ramp at work    Takes his meds but bp runs 150-160s at grocery store    Gets flu shot in the fall  Willing to consider shingrix injections         Patient Active Problem List    Diagnosis Date Noted    Severe obesity ([facility] [address] 10/24/2018    Impaired fasting glucose 06/09/2017    Cardiomyopathy ([facility] [address] 03/18/2013    LBBB (left bundle branch block) 03/04/2013    Hypertension     Hypercholesterolemia      Current Outpatient Medications   Medication Sig Dispense Refill    carvedilol (COREG) 25 mg tablet TAKE 1 TABLET BY MOUTH TWICE DAILY WITH MEALS 180 Tab 0    amLODIPine (NORVASC) 10 mg tablet Take 1 Tab by mouth daily.  90 Tab 0    atorvastatin (LIPITOR) 40 mg tablet TAKE 1 TABLET BY MOUTH NIGHTLY 90 Tab 0    losartan-hydroCHLOROthiazide (HYZAAR) 100-25 mg per tablet TAKE 1 TABLET BY MOUTH ONCE DAILY 90 Tab 0    aspirin delayed-release 81 mg tablet Take 1 Tab by mouth daily. 30 Tab 5     Allergies   Allergen Reactions    Ace Inhibitors Cough     ED     Past Medical History:   Diagnosis Date    Hypercholesteremia     Hypertension     Obesity      Past Surgical History:   Procedure Laterality Date    HX TONSILLECTOMY       Family History   Problem Relation Age of Onset    Kidney Disease Mother     Hypertension Mother     Lung Disease Father         lung cancer    Hypertension Maternal Grandmother     Diabetes Maternal Grandmother      Social History     Tobacco Use    Smoking status: Never Smoker    Smokeless tobacco: Former User     Types: Chew   Substance Use Topics    Alcohol use: Yes     Comment: occasional-twice a month      Social History     Social History Narrative    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 General: stated age,obese, and in NAD  Eyes: PERRL, EOMI, no redness or drainage  Nose: no drainage  Mouth: no lesions  Throat: no erythema, exudate or swelling  Neck: supple, symmetrical, trachea midline, no adenopathy and thyroid: not enlarged, symmetric, no tenderness/mass/nodules  Lungs:  clear to auscultation w/o rales, rhonchi, wheezes w/normal effort and no use of accessory muscles of respiration   Heart: regular rate and rhythm, S1, S2 normal, no murmur, click, rub or gallop  Abdomen: soft, nontender, no masses  Rect: prostate not enlarged or tender, is symmetric without nodules  Ext:  1+ pitting edema noted in both ankles/legs, no calf redness or tenderness.    Lymph: no cervical adenopathy appreciated  Skin:  Normal. and no rash or abnormalities   Neuro: normal gait, CN 2-12 intact  Psych: alert and oriented to person, place, time and situation and Speech: appropriate quality, quantity and organization of sentences and normal affect    Assessment/Plan:       ICD-10-CM ICD-9-CM    1. Encounter for preventive care Z00.00 V70.0 CBC WITH AUTOMATED DIFF      METABOLIC PANEL, COMPREHENSIVE      LIPID PANEL      HEMOGLOBIN A1C WITH EAG      ALDOSTERONE/RENIN ACTIVITY   2. Hypercholesterolemia E78.00 272.0 LIPID PANEL   3. Impaired fasting glucose R73.01 790.21 HEMOGLOBIN A1C WITH EAG   4. Severe obesity ([address] E66.01 278.01    5. Cardiomyopathy, unspecified type ([address] I42.9 425.4 ECHO ADULT COMPLETE   6. Urinary frequency R35.0 788.41 URINALYSIS W/ RFLX MICROSCOPIC      CULTURE, URINE   7. Urinary hesitancy R39.11 788.64 URINALYSIS W/ RFLX MICROSCOPIC      CULTURE, URINE      PSA W/ REFLX FREE PSA   8. Accelerated hypertension I10 401.0 ALDOSTERONE/RENIN ACTIVITY      REFERRAL TO PULMONARY DISEASE   9. Snoring R06.83 786.09 REFERRAL TO PULMONARY DISEASE   10. Daytime somnolence R40.0 780.54 REFERRAL TO PULMONARY DISEASE   11.  Edema of both lower extremities R60.0 782.3 ECHO ADULT COMPLETE        Orders Placed This Encounter    CULTURE, URINE    CBC WITH AUTOMATED DIFF    METABOLIC PANEL, COMPREHENSIVE    LIPID PANEL    HEMOGLOBIN A1C WITH EAG    URINALYSIS W/ RFLX MICROSCOPIC    PSA W/ REFLX FREE PSA    ALDOSTERONE/RENIN ACTIVITY    [facility]    DISCONTD: varicella-zoster recombinant, PF, ([address] [place] [address], ,) 50 mcg/0.5 mL susr injection     Health Maintenance   Topic Date Due    Shingrix Vaccine Age 49> (1 of 2) 04/28/2016    FOBT Q 1 YEAR AGE 50-75  04/28/2016    Influenza Age 5 to Adult  08/01/2019    DTaP/Tdap/Td series (2 - Td) 04/22/2026    Pneumococcal 0-64 years  Completed     discused preventive care, healthy lifestyle    Swelling not severe but could be heart failure  BP worse, despite 4 med therapy  Add aldactone, check renin/aldosterone, refer sleep study  Will consider checking renal arteries next  Needs closer follow up  Encouraged low sodium diet    Not sure of cause of mild urinary frequency recently  Prostate exam normal  Check psa and urine  Will consider referral urology if this continues    Patient Instructions     To schedule the echo test for your heart, please call:  [phone] [facility] scheduling)    To schedule with the lung doctor about getting a sleep study:  Dr. Radha Dalal of [address] [place] [address]  Phone: [phone]    To schedule for the colonoscopy:    Call Dr. Torres Falling office at [phone]; For your blood pressure: We will do labs, we will check the sleep study  We should consider an ultrasound of your kidneys if we can't get your blood pressure controlled  Add spironolactone 50mg daily    Your blood pressure should be less than 140/90 consistently (more than 90% of the time). Check it after sitting and resting for 10 minutes for an accurate result. Please call me if it is often running high. Discussed the diagnosis and plan and he expressed understanding. Follow-up and Dispositions    · Return in about 2 months (around 8/7/2019) for Follow up blood pressure.          Amy Brewer MD

## 2019-06-07 NOTE — PATIENT INSTRUCTIONS
To schedule the echo test for your heart, please call: 
[phone] [facility] scheduling) To schedule with the lung doctor about getting a sleep study: 
Dr. Arana [facility] 
[address] [place], [address] Phone: [phone] To schedule for the colonoscopy: 
 
Call Dr. Yamileth Tobias office at [phone]; For your blood pressure: We will do labs, we will check the sleep study We should consider an ultrasound of your kidneys if we can't get your blood pressure controlled Add spironolactone 50mg daily Your blood pressure should be less than 140/90 consistently (more than 90% of the time). Check it after sitting and resting for 10 minutes for an accurate result. Please call me if it is often running high. DASH Diet: Care Instructions Your Care Instructions The DASH diet is an eating plan that can help lower your blood pressure. DASH stands for Dietary Approaches to Stop Hypertension. Hypertension is high blood pressure. The DASH diet focuses on eating foods that are high in calcium, potassium, and magnesium. These nutrients can lower blood pressure. The foods that are highest in these nutrients are fruits, vegetables, low-fat dairy products, nuts, seeds, and legumes. But taking calcium, potassium, and magnesium supplements instead of eating foods that are high in those nutrients does not have the same effect. The DASH diet also includes whole grains, fish, and poultry. The DASH diet is one of several lifestyle changes your doctor may recommend to lower your high blood pressure. Your doctor may also want you to decrease the amount of sodium in your diet. Lowering sodium while following the DASH diet can lower blood pressure even further than just the DASH diet alone. Follow-up care is a key part of your treatment and safety.  Be sure to make and go to all appointments, and call your doctor if you are having problems. It's also a good idea to know your test results and keep a list of the medicines you take. How can you care for yourself at home? Following the DASH diet · Eat 4 to 5 servings of fruit each day. A serving is 1 medium-sized piece of fruit, ½ cup chopped or canned fruit, 1/4 cup dried fruit, or 4 ounces (½ cup) of fruit juice. Choose fruit more often than fruit juice. · Eat 4 to 5 servings of vegetables each day. A serving is 1 cup of lettuce or raw leafy vegetables, ½ cup of chopped or cooked vegetables, or 4 ounces (½ cup) of vegetable juice. Choose vegetables more often than vegetable juice. · Get 2 to 3 servings of low-fat and fat-free dairy each day. A serving is 8 ounces of milk, 1 cup of yogurt, or 1 ½ ounces of cheese. · Eat 6 to 8 servings of grains each day. A serving is 1 slice of bread, 1 ounce of dry cereal, or ½ cup of cooked rice, pasta, or cooked cereal. Try to choose whole-grain products as much as possible. · Limit lean meat, poultry, and fish to 2 servings each day. A serving is 3 ounces, about the size of a deck of cards. · Eat 4 to 5 servings of nuts, seeds, and legumes (cooked dried beans, lentils, and split peas) each week. A serving is 1/3 cup of nuts, 2 tablespoons of seeds, or ½ cup of cooked beans or peas. · Limit fats and oils to 2 to 3 servings each day. A serving is 1 teaspoon of vegetable oil or 2 tablespoons of salad dressing. · Limit sweets and added sugars to 5 servings or less a week. A serving is 1 tablespoon jelly or jam, ½ cup sorbet, or 1 cup of lemonade. · Eat less than 2,300 milligrams (mg) of sodium a day. If you limit your sodium to 1,500 mg a day, you can lower your blood pressure even more. Tips for success · Start small. Do not try to make dramatic changes to your diet all at once. You might feel that you are missing out on your favorite foods and then be more likely to not follow the plan.  Make small changes, and stick with them. Once those changes become habit, add a few more changes. · Try some of the following: ? Make it a goal to eat a fruit or vegetable at every meal and at snacks. This will make it easy to get the recommended amount of fruits and vegetables each day. ? Try yogurt topped with fruit and nuts for a snack or healthy dessert. ? Add lettuce, tomato, cucumber, and onion to sandwiches. ? Combine a ready-made pizza crust with low-fat mozzarella cheese and lots of vegetable toppings. Try using tomatoes, squash, spinach, broccoli, carrots, cauliflower, and onions. ? Have a variety of cut-up vegetables with a low-fat dip as an appetizer instead of chips and dip. ? Sprinkle sunflower seeds or chopped almonds over salads. Or try adding chopped walnuts or almonds to cooked vegetables. ? Try some vegetarian meals using beans and peas. Add garbanzo or kidney beans to salads. Make burritos and tacos with mashed tripathi beans or black beans. Where can you learn more? Go to http://josué-martine.info/. Enter X789 in the search box to learn more about \"DASH Diet: Care Instructions. \" Current as of: July 22, 2018 Content Version: 11.9 © 1208-1780 [employer]. Care instructions adapted under license by [employer] (which disclaims liability or warranty for this information). If you have questions about a medical condition or this instruction, always ask your healthcare professional. [place] [address] any warranty or liability for your use of this information. Well Visit, Men 48 to 72: Care Instructions Your Care Instructions Physical exams can help you stay healthy. Your doctor has checked your overall health and may have suggested ways to take good care of yourself. He or she also may have recommended tests. At home, you can help prevent illness with healthy eating, regular exercise, and other steps. Follow-up care is a key part of your treatment and safety. Be sure to make and go to all appointments, and call your doctor if you are having problems. It's also a good idea to know your test results and keep a list of the medicines you take. How can you care for yourself at home? · Reach and stay at a healthy weight. This will lower your risk for many problems, such as obesity, diabetes, heart disease, and high blood pressure. · Get at least 30 minutes of exercise on most days of the week. Walking is a good choice. You also may want to do other activities, such as running, swimming, cycling, or playing tennis or team sports. · Do not smoke. Smoking can make health problems worse. If you need help quitting, talk to your doctor about stop-smoking programs and medicines. These can increase your chances of quitting for good. · Protect your skin from too much sun. When you're outdoors from 10 a.m. to 4 p.m., stay in the shade or cover up with clothing and a hat with a wide brim. Wear sunglasses that block UV rays. Even when it's cloudy, put broad-spectrum sunscreen (SPF 30 or higher) on any exposed skin. · See a dentist one or two times a year for checkups and to have your teeth cleaned. · Wear a seat belt in the car. · Limit alcohol to 2 drinks a day. Too much alcohol can cause health problems. Follow your doctor's advice about when to have certain tests. These tests can spot problems early. · Cholesterol. Your doctor will tell you how often to have this done based on your overall health and other things that can increase your risk for heart attack and stroke. · Blood pressure. Have your blood pressure checked during a routine doctor visit. Your doctor will tell you how often to check your blood pressure based on your age, your blood pressure results, and other factors.  
· Prostate exam. Talk to your doctor about whether you should have a blood test (called a PSA test) for prostate cancer. Experts disagree on whether men should have this test. Some experts recommend that you discuss the benefits and risks of the test with your doctor. · Diabetes. Ask your doctor whether you should have tests for diabetes. · Vision. Some experts recommend that you have yearly exams for glaucoma and other age-related eye problems starting at age 48. · Hearing. Tell your doctor if you notice any change in your hearing. You can have tests to find out how well you hear. · Colon cancer. You should begin tests for colon cancer at age 48. You may have one of several tests. Your doctor will tell you how often to have tests based on your age and risk. Risks include whether you already had a precancerous polyp removed from your colon or whether your parent, brother, sister, or child has had colon cancer. · Heart attack and stroke risk. At least every 4 to 6 years, you should have your risk for heart attack and stroke assessed. Your doctor uses factors such as your age, blood pressure, cholesterol, and whether you smoke or have diabetes to show what your risk for a heart attack or stroke is over the next 10 years. · Abdominal aortic aneurysm. Ask your doctor whether you should have a test to check for an aneurysm. You may need a test if you ever smoked or if your parent, brother, sister, or child has had an aneurysm. When should you call for help? Watch closely for changes in your health, and be sure to contact your doctor if you have any problems or symptoms that concern you. Where can you learn more? Go to http://josué-martine.info/. Enter S629 in the search box to learn more about \"Well Visit, Men 48 to 72: Care Instructions. \" Current as of: March 28, 2018 Content Version: 11.9 © 0016-9888 [employer].  Care instructions adapted under license by [employer] (which disclaims liability or warranty for this information). If you have questions about a medical condition or this instruction, always ask your healthcare professional. [place] [address] any warranty or liability for your use of this information.

## 2019-06-12 PROBLEM — R80.8 OTHER PROTEINURIA: Status: ACTIVE | Noted: 2019-06-12

## 2019-06-12 LAB
ALBUMIN SERPL-MCNC: 4.8 G/DL (ref 3.5–5.5)
ALBUMIN/GLOB SERPL: 1.5 {RATIO} (ref 1.2–2.2)
ALDOST SERPL-MCNC: 7.4 NG/DL (ref 0–30)
ALP SERPL-CCNC: 56 IU/L (ref 39–117)
ALT SERPL-CCNC: 25 IU/L (ref 0–44)
APPEARANCE UR: CLEAR
AST SERPL-CCNC: 25 IU/L (ref 0–40)
BACTERIA #/AREA URNS HPF: ABNORMAL /[HPF]
BASOPHILS # BLD AUTO: 0 X10E3/UL (ref 0–0.2)
BASOPHILS NFR BLD AUTO: 0 %
BILIRUB SERPL-MCNC: 0.4 MG/DL (ref 0–1.2)
BILIRUB UR QL STRIP: NEGATIVE
BUN SERPL-MCNC: 12 MG/DL (ref 6–24)
BUN/CREAT SERPL: 15 (ref 9–20)
CALCIUM SERPL-MCNC: 10 MG/DL (ref 8.7–10.2)
CASTS URNS QL MICRO: ABNORMAL /LPF
CHLORIDE SERPL-SCNC: 101 MMOL/L (ref 96–106)
CHOLEST SERPL-MCNC: 221 MG/DL (ref 100–199)
CO2 SERPL-SCNC: 25 MMOL/L (ref 20–29)
COLOR UR: YELLOW
CREAT SERPL-MCNC: 0.8 MG/DL (ref 0.76–1.27)
EOSINOPHIL # BLD AUTO: 0.1 X10E3/UL (ref 0–0.4)
EOSINOPHIL NFR BLD AUTO: 2 %
EPI CELLS #/AREA URNS HPF: ABNORMAL /HPF (ref 0–10)
ERYTHROCYTE [DISTWIDTH] IN BLOOD BY AUTOMATED COUNT: 14 % (ref 12.3–15.4)
EST. AVERAGE GLUCOSE BLD GHB EST-MCNC: 128 MG/DL
GLOBULIN SER CALC-MCNC: 3.3 G/DL (ref 1.5–4.5)
GLUCOSE SERPL-MCNC: 110 MG/DL (ref 65–99)
GLUCOSE UR QL: NEGATIVE
HBA1C MFR BLD: 6.1 % (ref 4.8–5.6)
HCT VFR BLD AUTO: 45.2 % (ref 37.5–51)
HDLC SERPL-MCNC: 61 MG/DL
HGB BLD-MCNC: 14.9 G/DL (ref 13–17.7)
HGB UR QL STRIP: NEGATIVE
IMM GRANULOCYTES # BLD AUTO: 0 X10E3/UL (ref 0–0.1)
IMM GRANULOCYTES NFR BLD AUTO: 0 %
INTERPRETATION, 910389: NORMAL
KETONES UR QL STRIP: NEGATIVE
LDLC SERPL CALC-MCNC: 123 MG/DL (ref 0–99)
LEUKOCYTE ESTERASE UR QL STRIP: NEGATIVE
LYMPHOCYTES # BLD AUTO: 1.8 X10E3/UL (ref 0.7–3.1)
LYMPHOCYTES NFR BLD AUTO: 26 %
MCH RBC QN AUTO: 28.1 PG (ref 26.6–33)
MCHC RBC AUTO-ENTMCNC: 33 G/DL (ref 31.5–35.7)
MCV RBC AUTO: 85 FL (ref 79–97)
MICRO URNS: ABNORMAL
MONOCYTES # BLD AUTO: 0.4 X10E3/UL (ref 0.1–0.9)
MONOCYTES NFR BLD AUTO: 6 %
NEUTROPHILS # BLD AUTO: 4.7 X10E3/UL (ref 1.4–7)
NEUTROPHILS NFR BLD AUTO: 66 %
NITRITE UR QL STRIP: NEGATIVE
PH UR STRIP: 6.5 [PH] (ref 5–7.5)
PLATELET # BLD AUTO: 248 X10E3/UL (ref 150–450)
POTASSIUM SERPL-SCNC: 3.9 MMOL/L (ref 3.5–5.2)
PROT SERPL-MCNC: 8.1 G/DL (ref 6–8.5)
PROT UR QL STRIP: ABNORMAL
PSA SERPL-MCNC: 1.1 NG/ML (ref 0–4)
RBC # BLD AUTO: 5.31 X10E6/UL (ref 4.14–5.8)
RBC #/AREA URNS HPF: ABNORMAL /HPF (ref 0–2)
REFLEX CRITERIA: NORMAL
RENIN PLAS-CCNC: 1.56 NG/ML/HR (ref 0.17–5.38)
SODIUM SERPL-SCNC: 141 MMOL/L (ref 134–144)
SP GR UR: 1.02 (ref 1–1.03)
TRIGL SERPL-MCNC: 187 MG/DL (ref 0–149)
UROBILINOGEN UR STRIP-MCNC: 0.2 MG/DL (ref 0.2–1)
VLDLC SERPL CALC-MCNC: 37 MG/DL (ref 5–40)
WBC # BLD AUTO: 7.1 X10E3/UL (ref 3.4–10.8)
WBC #/AREA URNS HPF: ABNORMAL /HPF (ref 0–5)

## 2019-06-12 NOTE — PROGRESS NOTES
Sent in letter:  Overall, your labs were good. Kidney, liver, prostate, sugar, electrolytes, and blood counts were good. Cholesterol is decent on the lipitor. Urine had a bit of protein, probably due to your high blood pressure. We should recheck when you see me next month. I hope the new medication is helping you!

## 2019-08-23 ENCOUNTER — OFFICE VISIT (OUTPATIENT)
Dept: FAMILY MEDICINE CLINIC | Age: 54
End: 2019-08-23

## 2019-08-23 VITALS
WEIGHT: 212.2 LBS | HEART RATE: 73 BPM | TEMPERATURE: 98.5 F | OXYGEN SATURATION: 98 % | RESPIRATION RATE: 18 BRPM | DIASTOLIC BLOOD PRESSURE: 69 MMHG | SYSTOLIC BLOOD PRESSURE: 115 MMHG | BODY MASS INDEX: 33.3 KG/M2 | HEIGHT: 67 IN

## 2019-08-23 DIAGNOSIS — E66.01 SEVERE OBESITY (HCC): ICD-10-CM

## 2019-08-23 DIAGNOSIS — N52.9 ERECTILE DYSFUNCTION, UNSPECIFIED ERECTILE DYSFUNCTION TYPE: ICD-10-CM

## 2019-08-23 DIAGNOSIS — I42.9 CARDIOMYOPATHY, UNSPECIFIED TYPE (HCC): ICD-10-CM

## 2019-08-23 DIAGNOSIS — Z12.11 COLON CANCER SCREENING: ICD-10-CM

## 2019-08-23 DIAGNOSIS — I10 ESSENTIAL HYPERTENSION: ICD-10-CM

## 2019-08-23 DIAGNOSIS — R80.8 OTHER PROTEINURIA: Primary | ICD-10-CM

## 2019-08-23 DIAGNOSIS — R06.83 SNORING: ICD-10-CM

## 2019-08-23 DIAGNOSIS — R60.0 EDEMA OF BOTH LOWER EXTREMITIES: ICD-10-CM

## 2019-08-23 DIAGNOSIS — R40.0 DAYTIME SOMNOLENCE: ICD-10-CM

## 2019-08-23 DIAGNOSIS — R68.82 DECREASED LIBIDO: ICD-10-CM

## 2019-08-23 DIAGNOSIS — Z23 ENCOUNTER FOR IMMUNIZATION: ICD-10-CM

## 2019-08-23 RX ORDER — ATORVASTATIN CALCIUM 40 MG/1
TABLET, FILM COATED ORAL
Qty: 90 TAB | Refills: 1 | Status: SHIPPED | OUTPATIENT
Start: 2019-08-23 | End: 2020-02-25 | Stop reason: SDUPTHER

## 2019-08-23 RX ORDER — AMLODIPINE BESYLATE 10 MG/1
10 TABLET ORAL DAILY
Qty: 90 TAB | Refills: 1 | Status: SHIPPED | OUTPATIENT
Start: 2019-08-23 | End: 2020-02-25 | Stop reason: SDUPTHER

## 2019-08-23 RX ORDER — SPIRONOLACTONE 50 MG/1
50 TABLET, FILM COATED ORAL DAILY
Qty: 90 TAB | Refills: 1 | Status: SHIPPED | OUTPATIENT
Start: 2019-08-23 | End: 2020-02-25 | Stop reason: SDUPTHER

## 2019-08-23 RX ORDER — LOSARTAN POTASSIUM AND HYDROCHLOROTHIAZIDE 25; 100 MG/1; MG/1
TABLET ORAL
Qty: 90 TAB | Refills: 1 | Status: SHIPPED | OUTPATIENT
Start: 2019-08-23 | End: 2019-11-15 | Stop reason: RX

## 2019-08-23 RX ORDER — CARVEDILOL 25 MG/1
TABLET ORAL
Qty: 180 TAB | Refills: 1 | Status: SHIPPED | OUTPATIENT
Start: 2019-08-23 | End: 2020-02-25 | Stop reason: SDUPTHER

## 2019-08-23 NOTE — PROGRESS NOTES
Chief Complaint   Patient presents with    Hypertension     f/u     1. Have you been to the ER, urgent care clinic since your last visit? Hospitalized since your last visit? No    2. Have you seen or consulted any other health care providers outside of the [address] [place] [address] since your last visit? Include any pap smears or colon screening. Christie  Ivette Mota is a 49 y.o. male  who presents for routine immunizations. he denies any symptoms , reactions or allergies that would exclude them from being immunized today. Risks and adverse reactions were discussed and the VIS was given to them. All questions were addressed. he was observed for 10 min post injection. There were no reactions observed.     Miguel Arteaga, HORACEN

## 2019-08-23 NOTE — PATIENT INSTRUCTIONS
To schedule the echo, please call:  [phone] [facility] scheduling)    To schedule the sleep study and the colonoscopy please call numbers on attached referral sheets    Come back to our lab one day soon, Mon-Sat, 8am, fasting. Check in at the  but no appointment needed. We will check kidney function, urine tests, and testosterone    Keep up the great work with your lifestyle--I am impressed! If your blood pressure is often low or if you feel dizzy, let me know so we can cut back your medication a bit. DASH Diet: Care Instructions  Your Care Instructions    The DASH diet is an eating plan that can help lower your blood pressure. DASH stands for Dietary Approaches to Stop Hypertension. Hypertension is high blood pressure. The DASH diet focuses on eating foods that are high in calcium, potassium, and magnesium. These nutrients can lower blood pressure. The foods that are highest in these nutrients are fruits, vegetables, low-fat dairy products, nuts, seeds, and legumes. But taking calcium, potassium, and magnesium supplements instead of eating foods that are high in those nutrients does not have the same effect. The DASH diet also includes whole grains, fish, and poultry. The DASH diet is one of several lifestyle changes your doctor may recommend to lower your high blood pressure. Your doctor may also want you to decrease the amount of sodium in your diet. Lowering sodium while following the DASH diet can lower blood pressure even further than just the DASH diet alone. Follow-up care is a key part of your treatment and safety. Be sure to make and go to all appointments, and call your doctor if you are having problems. It's also a good idea to know your test results and keep a list of the medicines you take. How can you care for yourself at home? Following the DASH diet  · Eat 4 to 5 servings of fruit each day.  A serving is 1 medium-sized piece of fruit, ½ cup chopped or canned fruit, 1/4 cup dried fruit, or 4 ounces (½ cup) of fruit juice. Choose fruit more often than fruit juice. · Eat 4 to 5 servings of vegetables each day. A serving is 1 cup of lettuce or raw leafy vegetables, ½ cup of chopped or cooked vegetables, or 4 ounces (½ cup) of vegetable juice. Choose vegetables more often than vegetable juice. · Get 2 to 3 servings of low-fat and fat-free dairy each day. A serving is 8 ounces of milk, 1 cup of yogurt, or 1 ½ ounces of cheese. · Eat 6 to 8 servings of grains each day. A serving is 1 slice of bread, 1 ounce of dry cereal, or ½ cup of cooked rice, pasta, or cooked cereal. Try to choose whole-grain products as much as possible. · Limit lean meat, poultry, and fish to 2 servings each day. A serving is 3 ounces, about the size of a deck of cards. · Eat 4 to 5 servings of nuts, seeds, and legumes (cooked dried beans, lentils, and split peas) each week. A serving is 1/3 cup of nuts, 2 tablespoons of seeds, or ½ cup of cooked beans or peas. · Limit fats and oils to 2 to 3 servings each day. A serving is 1 teaspoon of vegetable oil or 2 tablespoons of salad dressing. · Limit sweets and added sugars to 5 servings or less a week. A serving is 1 tablespoon jelly or jam, ½ cup sorbet, or 1 cup of lemonade. · Eat less than 2,300 milligrams (mg) of sodium a day. If you limit your sodium to 1,500 mg a day, you can lower your blood pressure even more. Tips for success  · Start small. Do not try to make dramatic changes to your diet all at once. You might feel that you are missing out on your favorite foods and then be more likely to not follow the plan. Make small changes, and stick with them. Once those changes become habit, add a few more changes. · Try some of the following:  ? Make it a goal to eat a fruit or vegetable at every meal and at snacks. This will make it easy to get the recommended amount of fruits and vegetables each day.   ? Try yogurt topped with fruit and nuts for a snack or healthy dessert. ? Add lettuce, tomato, cucumber, and onion to sandwiches. ? Combine a ready-made pizza crust with low-fat mozzarella cheese and lots of vegetable toppings. Try using tomatoes, squash, spinach, broccoli, carrots, cauliflower, and onions. ? Have a variety of cut-up vegetables with a low-fat dip as an appetizer instead of chips and dip. ? Sprinkle sunflower seeds or chopped almonds over salads. Or try adding chopped walnuts or almonds to cooked vegetables. ? Try some vegetarian meals using beans and peas. Add garbanzo or kidney beans to salads. Make burritos and tacos with mashed tripathi beans or black beans. Where can you learn more? Go to http://josué-martine.info/. Enter H130 in the search box to learn more about \"DASH Diet: Care Instructions. \"  Current as of: July 22, 2018  Content Version: 12.1  © 3036-4292 [employer]. Care instructions adapted under license by [employer] (which disclaims liability or warranty for this information). If you have questions about a medical condition or this instruction, always ask your healthcare professional. [address] any warranty or liability for your use of this information.

## 2019-08-23 NOTE — PROGRESS NOTES
[facility], [address]  [phone]             Date of visit: 8/23/2019   Subjective:      History obtained from:  the patient. Priscilla Gordon is a 49 y.o. male who presents today for f/u chronic problems    Changed diet  Less sodium  More exercise  Weight down 12 pounds  No more swelling since he saw me  Lots of veggies  Diet changes not too hard    Didn't get echo or sleep study or colonoscopy yet but is willing    bp not as low as here but in normal range when wife checks it at home      Used to drink etoh every day and not doing that, goes to sleep after exercise  Work going well    Less libido since being on meds, not just the aldactone but before  Also ED, gets some am erection but not much  Wants tesosterone level checked    Patient Active Problem List    Diagnosis Date Noted    Edema of both lower extremities 08/23/2019    Other proteinuria 06/12/2019    Severe obesity ([facility] [address] 10/24/2018    Impaired fasting glucose 06/09/2017    Cardiomyopathy ([facility] [address] 03/18/2013    LBBB (left bundle branch block) 03/04/2013    Hypertension     Hypercholesterolemia      Current Outpatient Medications on File Prior to Visit   Medication Sig Dispense Refill    aspirin delayed-release 81 mg tablet Take 1 Tab by mouth daily. 30 Tab 5     No current facility-administered medications on file prior to visit.       Allergies   Allergen Reactions    Ace Inhibitors Cough     ED     Past Medical History:   Diagnosis Date    Hypercholesteremia     Hypertension     Obesity      Past Surgical History:   Procedure Laterality Date    HX TONSILLECTOMY       Family History   Problem Relation Age of Onset    Kidney Disease Mother     Hypertension Mother     Lung Disease Father         lung cancer    Hypertension Maternal Grandmother     Diabetes Maternal Grandmother      Social History     Tobacco Use    Smoking status: Never Smoker    Smokeless tobacco: Former User Types: Chew   Substance Use Topics    Alcohol use: Yes     Comment: occasional-twice a month      Social History     Social History Narrative    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 General: stated age, obese and in NAD  Neck: supple, symmetrical, trachea midline, no adenopathy and thyroid: not enlarged, symmetric, no tenderness/mass/nodules  Lungs:  clear to auscultation w/o rales, rhonchi, wheezes w/normal effort and no use of accessory muscles of respiration   Heart: regular rate and rhythm, S1, S2 normal, no murmur, click, rub or gallop  Ext:  No edema noted.    Lymph: no cervical adenopathy appreciated  Skin:  Normal. and no rash or abnormalities   Psych: alert and oriented to person, place, time and situation and Speech: appropriate quality, quantity and organization of sentences      Lab Results   Component Value Date/Time    Hemoglobin A1c 6.1 (H) 06/07/2019 01:56 PM     Lab Results   Component Value Date/Time    Cholesterol, total 221 (H) 06/07/2019 01:56 PM    HDL Cholesterol 61 06/07/2019 01:56 PM    LDL, calculated 123 (H) 06/07/2019 01:56 PM    VLDL, calculated 37 06/07/2019 01:56 PM    Triglyceride 187 (H) 06/07/2019 01:56 PM     Lab Results   Component Value Date/Time    Sodium 141 06/07/2019 01:56 PM    Potassium 3.9 06/07/2019 01:56 PM    Chloride 101 06/07/2019 01:56 PM    CO2 25 06/07/2019 01:56 PM    Anion gap 9 11/25/2011 06:50 PM    Glucose 110 (H) 06/07/2019 01:56 PM    BUN 12 06/07/2019 01:56 PM    Creatinine 0.80 06/07/2019 01:56 PM    BUN/Creatinine ratio 15 06/07/2019 01:56 PM    GFR est  06/07/2019 01:56 PM    GFR est non- 06/07/2019 01:56 PM    Calcium 10.0 06/07/2019 01:56 PM    Bilirubin, total 0.4 06/07/2019 01:56 PM    AST (SGOT) 25 06/07/2019 01:56 PM    Alk. phosphatase 56 06/07/2019 01:56 PM    Protein, total 8.1 06/07/2019 01:56 PM    Albumin 4.8 06/07/2019 01:56 PM    Globulin 4.5 (H) 11/25/2011 06:50 PM    A-G Ratio 1.5 06/07/2019 01:56 PM    ALT (SGPT) 25 06/07/2019 01:56 PM     Lab Results   Component Value Date/Time    WBC 7.1 06/07/2019 01:56 PM    HGB 14.9 06/07/2019 01:56 PM    HCT 45.2 06/07/2019 01:56 PM    PLATELET 088 21/63/9524 01:56 PM    MCV 85 06/07/2019 01:56 PM     Lab Results   Component Value Date/Time    TSH 2.820 05/16/2014 09:10 AM    TSH 1.520 02/22/2013 12:01 PM     No results found for: VITD3, [place], [id number]           Assessment/Plan:       ICD-10-CM ICD-9-CM    1. Other proteinuria P81.0 202.6 METABOLIC PANEL, BASIC      URINALYSIS W/ RFLX MICROSCOPIC      PROT+CREATU (RANDOM)   2. Edema of both lower extremities R60.0 782.3 REFERRAL TO PULMONARY DISEASE   3. Cardiomyopathy, unspecified type ([facility] [address] I42.9 425.4 REFERRAL TO PULMONARY DISEASE   4. Severe obesity (HCC) E66.01 278.01 REFERRAL TO PULMONARY DISEASE   5. Essential hypertension I10 401.9 carvedilol (COREG) 25 mg tablet      losartan-hydroCHLOROthiazide (HYZAAR) 100-25 mg per tablet   6. Encounter for immunization Z23 V03.89 INFLUENZA VIRUS VAC QUAD,SPLIT,PRESV FREE SYRINGE IM      UT IMMUNIZ ADMIN,1 SINGLE/COMB VAC/TOXOID   7. Snoring R06.83 786.09 REFERRAL TO PULMONARY DISEASE   8. Daytime somnolence R40.0 780.54 REFERRAL TO PULMONARY DISEASE   9. Colon cancer screening Z12.11 V76.51 REFERRAL TO GASTROENTEROLOGY   10. Decreased libido R68.82 799.81 TESTOSTERONE, FREE & TOTAL   11.  Erectile dysfunction, unspecified erectile dysfunction type N52.9 607.84 TESTOSTERONE, FREE & TOTAL       Orders Placed This Encounter    Influenza virus vaccine (QUADRIVALENT PRES FREE SYRINGE) IM (56449)    METABOLIC PANEL, BASIC    URINALYSIS W/ RFLX MICROSCOPIC    PROT+CREATU (RANDOM)    TESTOSTERONE, FREE & TOTAL    REFERRAL TO PULMONARY DISEASE    [facility]    VT IMMUNIZ ADMIN,1 SINGLE/COMB VAC/TOXOID    spironolactone (ALDACTONE) 50 mg tablet    carvedilol (COREG) 25 mg tablet    amLODIPine (NORVASC) 10 mg tablet    atorvastatin (LIPITOR) 40 mg tablet    losartan-hydroCHLOROthiazide (HYZAAR) 100-25 mg per tablet     Doing really well with lifestyle  Swelling resolved and bp improved  Obesity improved  Encouraged him to keep up the good work! Still needs tests  Will also check testosterone due to ED and dec libido but suspect that is side effect of his coreg or another med  Really needs the coreg for his heart  May try viagra but will see labs first    Discussed the diagnosis and plan and he expressed understanding. Patient Instructions     To schedule the echo, please call:  [phone] [facility] scheduling)    To schedule the sleep study and the colonoscopy please call numbers on attached referral sheets    Come back to our lab one day soon, Mon-Sat, 8am, fasting. Check in at the  but no appointment needed. We will check kidney function, urine tests, and testosterone    Keep up the great work with your lifestyle--I am impressed! If your blood pressure is often low or if you feel dizzy, let me know so we can cut back your medication a bit. Follow-up and Dispositions    · Return in about 6 months (around 2/23/2020).           Hue Bae MD

## 2019-08-26 PROBLEM — N52.9 ERECTILE DYSFUNCTION: Status: ACTIVE | Noted: 2019-08-26

## 2019-08-26 PROBLEM — R68.82 DECREASED LIBIDO: Status: ACTIVE | Noted: 2019-08-26

## 2019-08-26 PROBLEM — R80.8 OTHER PROTEINURIA: Status: RESOLVED | Noted: 2019-06-12 | Resolved: 2019-08-26

## 2019-08-26 LAB
APPEARANCE UR: CLEAR
BILIRUB UR QL STRIP: NEGATIVE
BUN SERPL-MCNC: 19 MG/DL (ref 6–24)
BUN/CREAT SERPL: 21 (ref 9–20)
CALCIUM SERPL-MCNC: 9.7 MG/DL (ref 8.7–10.2)
CHLORIDE SERPL-SCNC: 100 MMOL/L (ref 96–106)
CO2 SERPL-SCNC: 21 MMOL/L (ref 20–29)
COLOR UR: YELLOW
CREAT SERPL-MCNC: 0.89 MG/DL (ref 0.76–1.27)
CREAT UR-MCNC: 108.7 MG/DL
GLUCOSE SERPL-MCNC: 109 MG/DL (ref 65–99)
GLUCOSE UR QL: NEGATIVE
HGB UR QL STRIP: NEGATIVE
KETONES UR QL STRIP: NEGATIVE
LEUKOCYTE ESTERASE UR QL STRIP: NEGATIVE
MICRO URNS: NORMAL
NITRITE UR QL STRIP: NEGATIVE
PH UR STRIP: 6 [PH] (ref 5–7.5)
POTASSIUM SERPL-SCNC: 3.9 MMOL/L (ref 3.5–5.2)
PROT UR QL STRIP: NEGATIVE
PROT UR-MCNC: 9.7 MG/DL
PROT/CREAT UR: 89 MG/G CREAT (ref 0–200)
SODIUM SERPL-SCNC: 137 MMOL/L (ref 134–144)
SP GR UR: 1.02 (ref 1–1.03)
TESTOST FREE SERPL-MCNC: 7.4 PG/ML (ref 7.2–24)
TESTOST SERPL-MCNC: 329 NG/DL (ref 264–916)
UROBILINOGEN UR STRIP-MCNC: 0.2 MG/DL (ref 0.2–1)

## 2019-08-26 NOTE — PROGRESS NOTES
Sent in letter:  Good news--no protein in the urine this time, and kidney function was normal!  Testosterone levels were normal.  I think your problem is probably from medication, which, unfortunately, you really need for your heart. I would recommend trying viagra and will enclose a prescription. Let me know if your insurance won't cover it. It is very important that you not take the viagra with nitroglycerin (a heart medicine used for chest pain). I think you don't even have this medication, but wanted to warn you just in case.

## 2019-08-27 RX ORDER — SILDENAFIL 50 MG/1
50 TABLET, FILM COATED ORAL AS NEEDED
Qty: 6 TAB | Refills: 6 | Status: SHIPPED | OUTPATIENT
Start: 2019-08-27 | End: 2020-02-25 | Stop reason: DRUGHIGH

## 2019-11-14 DIAGNOSIS — I10 ESSENTIAL HYPERTENSION: ICD-10-CM

## 2019-11-14 RX ORDER — LOSARTAN POTASSIUM AND HYDROCHLOROTHIAZIDE 25; 100 MG/1; MG/1
TABLET ORAL
Qty: 90 TAB | Refills: 1 | Status: CANCELLED | OUTPATIENT
Start: 2019-11-14

## 2019-11-14 NOTE — TELEPHONE ENCOUNTER
Pharm on file verified.  They are requesting a refill on the following meds,    Last refill. 08/23/2019    Requested Prescriptions     Pending Prescriptions Disp Refills    losartan-hydroCHLOROthiazide (HYZAAR) 100-25 mg per tablet 90 Tab 1     Sig: TAKE 1 TABLET BY MOUTH ONCE DAILY

## 2019-11-15 ENCOUNTER — TELEPHONE (OUTPATIENT)
Dept: FAMILY MEDICINE CLINIC | Age: 54
End: 2019-11-15

## 2019-11-15 RX ORDER — LOSARTAN POTASSIUM 100 MG/1
100 TABLET ORAL DAILY
Qty: 90 TAB | Refills: 0 | OUTPATIENT
Start: 2019-11-15 | End: 2020-02-25 | Stop reason: SDUPTHER

## 2019-11-15 RX ORDER — HYDROCHLOROTHIAZIDE 25 MG/1
25 TABLET ORAL DAILY
Qty: 90 TAB | Refills: 0 | OUTPATIENT
Start: 2019-11-15 | End: 2020-02-25 | Stop reason: SDUPTHER

## 2019-11-15 NOTE — TELEPHONE ENCOUNTER
[address] is returning a call back to the office.       Best callback:[phone]        BFQ:[birth year]

## 2019-11-15 NOTE — TELEPHONE ENCOUNTER
We did this refill on 8/23/19 for 90 + 1 refill. I LVM on pharmacy line. Asked them to call back it any problem.

## 2019-11-15 NOTE — TELEPHONE ENCOUNTER
Pharmacy called back to say that the hyzaar not available need to split med.   rx  Changed per verbal order fro Dr Glen Carlisle

## 2020-02-24 PROBLEM — E66.01 SEVERE OBESITY (HCC): Status: RESOLVED | Noted: 2018-10-24 | Resolved: 2020-02-24

## 2020-02-24 NOTE — PATIENT INSTRUCTIONS
No change to meds, but please find a way to remember them (pill boxes or an alarm on your phone?)    Cut out the tomato juice--the sodium is not good for you  Try to keep water with you, drink more water    Try to get back into regular aerobic exercise like walking    To schedule the echo please call:  [phone] [facility] scheduling)    To schedule the colonoscopy please call:  Dr. Dario Trevino [facility]  [address] [address]  [phone]         DASH Diet: Care Instructions  Your Care Instructions    The DASH diet is an eating plan that can help lower your blood pressure. DASH stands for Dietary Approaches to Stop Hypertension. Hypertension is high blood pressure. The DASH diet focuses on eating foods that are high in calcium, potassium, and magnesium. These nutrients can lower blood pressure. The foods that are highest in these nutrients are fruits, vegetables, low-fat dairy products, nuts, seeds, and legumes. But taking calcium, potassium, and magnesium supplements instead of eating foods that are high in those nutrients does not have the same effect. The DASH diet also includes whole grains, fish, and poultry. The DASH diet is one of several lifestyle changes your doctor may recommend to lower your high blood pressure. Your doctor may also want you to decrease the amount of sodium in your diet. Lowering sodium while following the DASH diet can lower blood pressure even further than just the DASH diet alone. Follow-up care is a key part of your treatment and safety. Be sure to make and go to all appointments, and call your doctor if you are having problems. It's also a good idea to know your test results and keep a list of the medicines you take. How can you care for yourself at home? Following the DASH diet  · Eat 4 to 5 servings of fruit each day.  A serving is 1 medium-sized piece of fruit, ½ cup chopped or canned fruit, 1/4 cup dried fruit, or 4 ounces (½ cup) of fruit juice. Choose fruit more often than fruit juice. · Eat 4 to 5 servings of vegetables each day. A serving is 1 cup of lettuce or raw leafy vegetables, ½ cup of chopped or cooked vegetables, or 4 ounces (½ cup) of vegetable juice. Choose vegetables more often than vegetable juice. · Get 2 to 3 servings of low-fat and fat-free dairy each day. A serving is 8 ounces of milk, 1 cup of yogurt, or 1 ½ ounces of cheese. · Eat 6 to 8 servings of grains each day. A serving is 1 slice of bread, 1 ounce of dry cereal, or ½ cup of cooked rice, pasta, or cooked cereal. Try to choose whole-grain products as much as possible. · Limit lean meat, poultry, and fish to 2 servings each day. A serving is 3 ounces, about the size of a deck of cards. · Eat 4 to 5 servings of nuts, seeds, and legumes (cooked dried beans, lentils, and split peas) each week. A serving is 1/3 cup of nuts, 2 tablespoons of seeds, or ½ cup of cooked beans or peas. · Limit fats and oils to 2 to 3 servings each day. A serving is 1 teaspoon of vegetable oil or 2 tablespoons of salad dressing. · Limit sweets and added sugars to 5 servings or less a week. A serving is 1 tablespoon jelly or jam, ½ cup sorbet, or 1 cup of lemonade. · Eat less than 2,300 milligrams (mg) of sodium a day. If you limit your sodium to 1,500 mg a day, you can lower your blood pressure even more. Tips for success  · Start small. Do not try to make dramatic changes to your diet all at once. You might feel that you are missing out on your favorite foods and then be more likely to not follow the plan. Make small changes, and stick with them. Once those changes become habit, add a few more changes. · Try some of the following:  ? Make it a goal to eat a fruit or vegetable at every meal and at snacks. This will make it easy to get the recommended amount of fruits and vegetables each day.   ? Try yogurt topped with fruit and nuts for a snack or healthy dessert. ? Add lettuce, tomato, cucumber, and onion to sandwiches. ? Combine a ready-made pizza crust with low-fat mozzarella cheese and lots of vegetable toppings. Try using tomatoes, squash, spinach, broccoli, carrots, cauliflower, and onions. ? Have a variety of cut-up vegetables with a low-fat dip as an appetizer instead of chips and dip. ? Sprinkle sunflower seeds or chopped almonds over salads. Or try adding chopped walnuts or almonds to cooked vegetables. ? Try some vegetarian meals using beans and peas. Add garbanzo or kidney beans to salads. Make burritos and tacos with mashed tripathi beans or black beans. Where can you learn more? Go to http://josué-martine.info/. Enter C115 in the search box to learn more about \"DASH Diet: Care Instructions. \"  Current as of: April 9, 2019  Content Version: 12.2  © 8317-9142 [employer]. Care instructions adapted under license by [employer] (which disclaims liability or warranty for this information). If you have questions about a medical condition or this instruction, always ask your healthcare professional. [facility] any warranty or liability for your use of this information.

## 2020-02-24 NOTE — PROGRESS NOTES
[facility]. [place], [address]  [phone]             Date of visit: 2/25/2020      Subjective:      History obtained from:  the patient. Noe Mccormick is a 49 y.o. male who presents today for f/u chronic problems    Says he is falling apart  Not taking med consistently, because out of his routine; On workers comp, hurt elbow at work; has a torn tendon, in PT, hoping to avoid surgery  Job was physial and repititious, hard on elbows and knees  Still has same health insurance    Was doing some exercise but stopped due to elbow pain  Might start back next week  Has been playing basketball some    Weight up 4lb  Went on cruise for 7 days and diet got off track then  Thinks he needs to choose healthy foods, more consistently throughout the day  Drinks some water but not much  Mostly beer, maybe 1 beer at night  Also some tomato juice he thinks salty    Blood pressure not checked usually, sometimes does at the store but doesn't remember numbers, maybe 160 last time  Swelling in legs in the evenings in the past but not recently  Still trying to eat low sodium other than the tomato juice    Noticed a tender area in left breast when his tiffanie shirt    History of cardiomyopathy, dilated with ER as low as 20% years ago thought due to HTN  Most recent echo 5 years ago with normal EF and mild left ventriculomegaly but not dilated  Had referred him for echo last year, not done yet but willing  Had referred him for sleep study due to high BP but has not done.  However denies snoring of daytime somnolence    ED we think due to meds, was given viagra, 50mg, thinks helped a little    Urinary frequency and hesitancy better than it used to be    Got first Shingrix in Sept and second in Dec    Colonoscopy not yet    Patient Active Problem List    Diagnosis Date Noted    Decreased libido 08/26/2019    Erectile dysfunction 08/26/2019    Edema of both lower extremities 08/23/2019    Impaired fasting glucose 06/09/2017    Cardiomyopathy ([facility] [address] 03/18/2013    LBBB (left bundle branch block) 03/04/2013    Hypertension     Hypercholesterolemia      Current Outpatient Medications   Medication Sig Dispense Refill    sildenafil citrate (VIAGRA) 100 mg tablet Take 1 Tab by mouth as needed for Erectile Dysfunction. 6 Tab 6    losartan (COZAAR) 100 mg tablet Take 1 Tab by mouth daily. 90 Tab 0    hydroCHLOROthiazide (HYDRODIURIL) 25 mg tablet Take 1 Tab by mouth daily. 90 Tab 0    spironolactone (ALDACTONE) 50 mg tablet Take 1 Tab by mouth daily. 90 Tab 1    carvedilol (COREG) 25 mg tablet TAKE 1 TABLET BY MOUTH TWICE DAILY WITH MEALS 180 Tab 1    amLODIPine (NORVASC) 10 mg tablet Take 1 Tab by mouth daily. 90 Tab 1    atorvastatin (LIPITOR) 40 mg tablet TAKE 1 TABLET BY MOUTH NIGHTLY 90 Tab 1    aspirin delayed-release 81 mg tablet Take 1 Tab by mouth daily. 30 Tab 5     Allergies   Allergen Reactions    Ace Inhibitors Cough     ED     Past Medical History:   Diagnosis Date    Hypercholesteremia     Hypertension     Obesity      Past Surgical History:   Procedure Laterality Date    HX TONSILLECTOMY       Family History   Problem Relation Age of Onset    Kidney Disease Mother     Hypertension Mother     Lung Disease Father         lung cancer    Hypertension Maternal Grandmother     Diabetes Maternal Grandmother      Social History     Tobacco Use    Smoking status: Never Smoker    Smokeless tobacco: Former User     Types: Chew   Substance Use Topics    Alcohol use: Yes     Comment: occasional-twice a month      Social History     Patient does not qualify to have social determinant information on file (likely too young).    Social History Narrative    Drives [facility]            Review of Systems  Gen: denies fever  CV denies chest pain  Pulm: denies sob  3 most recent PHQ Screens 2/25/2020   Little interest or pleasure in doing things Not at all Feeling down, depressed, irritable, or hopeless Not at all   Total Score PHQ 2 0       Objective:     Vitals:    02/25/20 0903   BP: (!) 148/102   Pulse: 79   Resp: 18   Temp: 98.3 °F (36.8 °C)   TempSrc: Oral   SpO2: 99%   Weight: 216 lb (98 kg)   Height: 5' 7\" (1.702 m)     Body mass index is 33.83 kg/m². General: stated age, obese and in NAD  Neck: supple, symmetrical, trachea midline, no adenopathy and thyroid: not enlarged, symmetric, no tenderness/mass/nodules  Lungs:  clear to auscultation w/o rales, rhonchi, wheezes w/normal effort and no use of accessory muscles of respiration   Heart: regular rate and rhythm, S1, S2 normal, no murmur, click, rub or gallop  Abdomen: soft, nontender, no masses  Ext:  No edema noted in either ankle this time  Lymph: no cervical adenopathy appreciated  Skin:  Normal. and no rash or abnormalities   Psych: alert and oriented to person, place, time and situation and Speech: appropriate quality, quantity and organization of sentencesand normal affect    Assessment/Plan:       ICD-10-CM ICD-9-CM    1. Essential hypertension Q30 031.9 METABOLIC PANEL, BASIC      ECHO COMPLETE STUDY   2. Edema of both lower extremities R60.0 782.3 ECHO COMPLETE STUDY   3. Cardiomyopathy, unspecified type (HCC) I42.9 425.4 ECHO COMPLETE STUDY   4. Erectile dysfunction, unspecified erectile dysfunction type N52.9 607.84    5. Impaired fasting glucose R73.01 790.21    6. Obesity (BMI 30.0-34. 9) E66.9 278.00    7. Hypercholesterolemia E78.00 272.0 LIPID PANEL   8. Encounter for hepatitis C screening test for low risk patient Z11.59 V73.89 HEPATITIS C AB   9. Encounter for screening for HIV Z11.4 V73.89 HIV 1/2 AG/AB, 4TH GENERATION,W RFLX CONFIRM   10.  Colon cancer screening Z12.11 V76.51 REFERRAL TO GASTROENTEROLOGY        Orders Placed This Encounter    METABOLIC PANEL, BASIC    LIPID PANEL    HEPATITIS C AB    HIV 1/2 AG/AB, 4TH GENERATION,W RFLX CONFIRM    [facility]    ECHO COMPLETE STUDY    sildenafil citrate (VIAGRA) 100 mg tablet     Health Maintenance   Topic Date Due    FOBT Q1Y Age 54-65  04/28/2016    Shingrix Vaccine Age 50> (2 of 2) 11/02/2019    A1C test (Diabetic or Prediabetic)  06/07/2020    Lipid Screen  06/07/2020    DTaP/Tdap/Td series (2 - Td) 04/22/2026    Influenza Age 5 to Adult  Completed    Pneumococcal 0-64 years  Completed     See plan below  Mainly needs to take his meds and get the studies done but o verall doing well    Patient Instructions   No change to meds, but please find a way to remember them (pill boxes or an alarm on your phone?)    Cut out the tomato juice--the sodium is not good for you  Try to keep water with you, drink more water    Try to get back into regular aerobic exercise like walking    To schedule the echo please call:  [phone] [facility] scheduling)    To schedule the colonoscopy please call:  Dr. [facility], [address] [facility] [address]  [phone]    Discussed the diagnosis and plan and he expressed understanding. Follow-up and Dispositions    · Return in about 4 months (around 6/25/2020) for Full Physical, Follow up.          Adrianna Barraza MD

## 2020-02-25 ENCOUNTER — OFFICE VISIT (OUTPATIENT)
Dept: FAMILY MEDICINE CLINIC | Age: 55
End: 2020-02-25

## 2020-02-25 VITALS
HEART RATE: 79 BPM | HEIGHT: 67 IN | BODY MASS INDEX: 33.9 KG/M2 | TEMPERATURE: 98.3 F | RESPIRATION RATE: 18 BRPM | SYSTOLIC BLOOD PRESSURE: 146 MMHG | OXYGEN SATURATION: 99 % | DIASTOLIC BLOOD PRESSURE: 92 MMHG | WEIGHT: 216 LBS

## 2020-02-25 DIAGNOSIS — I10 ESSENTIAL HYPERTENSION: Primary | ICD-10-CM

## 2020-02-25 DIAGNOSIS — N52.9 ERECTILE DYSFUNCTION, UNSPECIFIED ERECTILE DYSFUNCTION TYPE: ICD-10-CM

## 2020-02-25 DIAGNOSIS — Z11.59 ENCOUNTER FOR HEPATITIS C SCREENING TEST FOR LOW RISK PATIENT: ICD-10-CM

## 2020-02-25 DIAGNOSIS — E66.9 OBESITY (BMI 30.0-34.9): ICD-10-CM

## 2020-02-25 DIAGNOSIS — R73.01 IMPAIRED FASTING GLUCOSE: ICD-10-CM

## 2020-02-25 DIAGNOSIS — E78.00 HYPERCHOLESTEROLEMIA: ICD-10-CM

## 2020-02-25 DIAGNOSIS — Z12.11 COLON CANCER SCREENING: ICD-10-CM

## 2020-02-25 DIAGNOSIS — I42.9 CARDIOMYOPATHY, UNSPECIFIED TYPE (HCC): ICD-10-CM

## 2020-02-25 DIAGNOSIS — Z11.4 ENCOUNTER FOR SCREENING FOR HIV: ICD-10-CM

## 2020-02-25 DIAGNOSIS — R60.0 EDEMA OF BOTH LOWER EXTREMITIES: ICD-10-CM

## 2020-02-25 RX ORDER — LOSARTAN POTASSIUM 100 MG/1
100 TABLET ORAL DAILY
Qty: 90 TAB | Refills: 1 | Status: SHIPPED | OUTPATIENT
Start: 2020-02-25 | End: 2020-09-22

## 2020-02-25 RX ORDER — SPIRONOLACTONE 50 MG/1
50 TABLET, FILM COATED ORAL DAILY
Qty: 90 TAB | Refills: 1 | Status: SHIPPED | OUTPATIENT
Start: 2020-02-25 | End: 2020-09-22

## 2020-02-25 RX ORDER — AMLODIPINE BESYLATE 10 MG/1
10 TABLET ORAL DAILY
Qty: 90 TAB | Refills: 1 | Status: SHIPPED | OUTPATIENT
Start: 2020-02-25 | End: 2020-09-22

## 2020-02-25 RX ORDER — SILDENAFIL 100 MG/1
100 TABLET, FILM COATED ORAL AS NEEDED
Qty: 6 TAB | Refills: 6 | Status: SHIPPED | OUTPATIENT
Start: 2020-02-25

## 2020-02-25 RX ORDER — HYDROCHLOROTHIAZIDE 25 MG/1
25 TABLET ORAL DAILY
Qty: 90 TAB | Refills: 1 | Status: SHIPPED | OUTPATIENT
Start: 2020-02-25 | End: 2020-09-22

## 2020-02-25 RX ORDER — ATORVASTATIN CALCIUM 40 MG/1
TABLET, FILM COATED ORAL
Qty: 90 TAB | Refills: 1 | Status: SHIPPED | OUTPATIENT
Start: 2020-02-25 | End: 2020-11-18

## 2020-02-25 RX ORDER — CARVEDILOL 25 MG/1
TABLET ORAL
Qty: 180 TAB | Refills: 1 | Status: SHIPPED | OUTPATIENT
Start: 2020-02-25 | End: 2020-09-22

## 2020-02-25 NOTE — PROGRESS NOTES
Chief Complaint   Patient presents with    Hypertension     Patient is in the office today for a 6 month follow up.  Cholesterol Problem     1. Have you been to the ER, urgent care clinic since your last visit? Hospitalized since your last visit? No    2. Have you seen or consulted any other health care providers outside of the [address] [place] [address] since your last visit? Include any pap smears or colon screening.  No

## 2020-02-26 ENCOUNTER — TELEPHONE (OUTPATIENT)
Dept: FAMILY MEDICINE CLINIC | Age: 55
End: 2020-02-26

## 2020-02-26 DIAGNOSIS — N63.20 LEFT BREAST MASS: Primary | ICD-10-CM

## 2020-02-26 DIAGNOSIS — N64.4 BREAST PAIN, LEFT: ICD-10-CM

## 2020-02-26 LAB
BUN SERPL-MCNC: 13 MG/DL (ref 6–24)
BUN/CREAT SERPL: 14 (ref 9–20)
CALCIUM SERPL-MCNC: 9.4 MG/DL (ref 8.7–10.2)
CHLORIDE SERPL-SCNC: 100 MMOL/L (ref 96–106)
CHOLEST SERPL-MCNC: 306 MG/DL (ref 100–199)
CO2 SERPL-SCNC: 23 MMOL/L (ref 20–29)
CREAT SERPL-MCNC: 0.96 MG/DL (ref 0.76–1.27)
GLUCOSE SERPL-MCNC: 109 MG/DL (ref 65–99)
HCV AB S/CO SERPL IA: <0.1 S/CO RATIO (ref 0–0.9)
HDLC SERPL-MCNC: 53 MG/DL
HIV 1+2 AB+HIV1 P24 AG SERPL QL IA: NON REACTIVE
INTERPRETATION, 910389: NORMAL
LDLC SERPL CALC-MCNC: ABNORMAL MG/DL (ref 0–99)
PDF IMAGE, 910387: NORMAL
POTASSIUM SERPL-SCNC: 4.2 MMOL/L (ref 3.5–5.2)
SODIUM SERPL-SCNC: 137 MMOL/L (ref 134–144)
TRIGL SERPL-MCNC: 457 MG/DL (ref 0–149)
VLDLC SERPL CALC-MCNC: ABNORMAL MG/DL (ref 5–40)

## 2020-02-26 NOTE — TELEPHONE ENCOUNTER
Called him because I realized I had forgotten to check his left breast. He had complained of soreness and a lump there, and I meant to check it while he was in the office. Talked to him on the phone, he says there is definitely a tender lump under the nipple. Will order mammogram, he is willing to do that. Also explained labs pretty good except cholesterol worse, needs to get in habit of remembering his meds. He expressed understanding.

## 2020-02-26 NOTE — PROGRESS NOTES
Sent in letter:  Labs were pretty good, including kidney, sugar, and electrolytes. Cholesterol is worse, so you need to be sure to find a way to remember your atorvastatin pill. Avoiding fast food/junk food/ red meat also helps. Routine screening tests for hepatitis C and HIV were negative.

## 2020-11-18 RX ORDER — ATORVASTATIN CALCIUM 40 MG/1
TABLET, FILM COATED ORAL
Qty: 90 TAB | Refills: 0 | Status: SHIPPED | OUTPATIENT
Start: 2020-11-18

## 2021-03-09 DIAGNOSIS — I10 ESSENTIAL HYPERTENSION: ICD-10-CM

## 2021-03-09 NOTE — TELEPHONE ENCOUNTER
[address] called stating their fax failed to send. Requesting carvedilol for patient. Patient is due appt and new physician. Last Visit: 2/25/20 MD Mckenzie  Next Appointment: Not scheduled- Appt. Due! Previous Refill Encounter(s): 9/22/20 180    Requested Prescriptions     Pending Prescriptions Disp Refills    carvediloL (COREG) 25 mg tablet 180 Tab 0     Sig: Take 1 Tab by mouth two (2) times daily (with meals).

## 2021-04-01 NOTE — TELEPHONE ENCOUNTER
OUTBOUND CALL: advised pt he needed a new to provider appt, pt stated he would call back later to schedule

## 2021-04-16 RX ORDER — CARVEDILOL 25 MG/1
25 TABLET ORAL 2 TIMES DAILY WITH MEALS
Qty: 180 TAB | Refills: 0 | OUTPATIENT
Start: 2021-04-16

## 2021-11-12 LAB — HBA1C MFR BLD HPLC: 5.9 %

## 2021-12-28 PROBLEM — M77.12 LATERAL EPICONDYLITIS, LEFT ELBOW: Status: ACTIVE | Noted: 2021-12-28

## 2021-12-29 ENCOUNTER — OFFICE VISIT (OUTPATIENT)
Dept: ORTHOPEDIC SURGERY | Age: 56
End: 2021-12-29
Payer: OTHER MISCELLANEOUS

## 2021-12-29 VITALS — BODY MASS INDEX: 34.06 KG/M2 | WEIGHT: 217 LBS | HEIGHT: 67 IN

## 2021-12-29 DIAGNOSIS — M77.12 LATERAL EPICONDYLITIS, LEFT ELBOW: Primary | ICD-10-CM

## 2021-12-29 PROCEDURE — 99214 OFFICE O/P EST MOD 30 MIN: CPT | Performed by: ORTHOPAEDIC SURGERY

## 2021-12-29 RX ORDER — MELOXICAM 15 MG/1
15 TABLET ORAL DAILY
Qty: 30 TABLET | Refills: 3 | Status: SHIPPED | OUTPATIENT
Start: 2021-12-29

## 2021-12-29 RX ORDER — LOSARTAN POTASSIUM AND HYDROCHLOROTHIAZIDE 25; 100 MG/1; MG/1
1 TABLET ORAL DAILY
COMMUNITY
Start: 2021-08-06

## 2021-12-29 NOTE — PROGRESS NOTES
ASSESSMENT/PLAN:  Below is the assessment and plan developed based on review of pertinent history, physical exam, labs, studies, and medications. 1. Lateral epicondylitis, left elbow      No follow-ups on file. In discussion with the patient, we considered the numerus possible diagnoses that could be contributing to their present symptoms. We also deliberated on the extensive management options that must be considered to treat their current condition. We reviewed their accessible prior medical records, diagnostic tests, and current health and employment information. We considered how these symptoms were affecting the patient´s activities of daily living as well as employment and fitness activities. The patient had various questions regarding the possible risks, benefits, complications, morbidity and mortality regarding their diagnosis and treatment options. The patients´ comorbidities were considered, and I advocated that they consider maximizing lifestyle modification through nutrition and exercise to aid in addressing their symptoms. Shared decision making yielded an understanding to move forward with conservation treatment preferences. The patient expressed understanding that if conservative management fails to alleviate the present symptoms they will return to office for re-evaluation and consideration of additional diagnostic tests and potential surgical options. In the interim, we have recommned ice, elevation and anti-inflammatory medications along with a physician directed home exercise program. We discussed the risks and common side effects of anti-inflamatory medications and instructed the patient to discontinue the medication and contact us if they experienced any side effects. The patient was encouraged to discuss the possible side effects with their family physician or pharmacist prior to initiating any new medications.      After a long discussion regarding treatment options, we have decided to prescribe an oral medication. We discussed the risks and common side effects of the medication and instructed the patient to discontinue the medication and contact us if they experienced any side effects. We also encouraged the patient to discuss the possible side effects with their family physician or pharmacist prior to initiating any new medications. SUBJECTIVE/OBJECTIVE:  Susan Talbot (: 1965) is a 55 y.o. male, patient,here for evaluation of the No chief complaint on file. .   Mr. Glenn Cheema returns today for follow-up of his left shoulder and elbow. He initially underwent left elbow open lateral epicondylar release as well as medial epicondyle release on 10/22/2020. After a long recovery. He had an FCE rating on 2021 which revealed that he was able to function in the medium physical demand level and had a 5% upper extremity impairment with a 3% whole body impairment. He follows up today. He has been taking occasional Mobic. He denies any reinjury. Physical Exam    Upon physical examination, the patient is well developed, well nourished, alert and oriented times three, with normal mood and affect and walks with a normal gait. Upon examination of the left elbow, the patient sits with normal posture. They are tender to palpation over the lateral epicondyle and extensor origin. The patient has full range of motion, but discomfort with resisted wrist extension and gripping. They have 5/5 strength, and are neurovascularly intact distally. There is no erythema, warmth or skin lesions present. On examination of the contralateral extremity, the patient is nontender to palpation and has excellent range of motion, stability and strength. Imaging:    I have reviewed the patient´s previous diagnostic tests in an effort to support the diagnosis and treatment options.     Allergies   Allergen Reactions    Ace Inhibitors Cough     ED       Current Outpatient Medications   Medication Sig    atorvastatin (LIPITOR) 40 mg tablet Take 1 tablet by mouth nightly    hydroCHLOROthiazide (HYDRODIURIL) 25 mg tablet TAKE 1 TABLET BY MOUTH ONCE DAILY, *NEEDS APPOINTMENT*    carvediloL (COREG) 25 mg tablet TAKE 1 TABLET BY MOUTH TWICE DAILY WITH MEALS    amLODIPine (NORVASC) 10 mg tablet Take 1 tablet by mouth once daily    spironolactone (ALDACTONE) 50 mg tablet Take 1 tablet by mouth once daily    losartan (COZAAR) 100 mg tablet Take 1 tablet by mouth once daily    sildenafil citrate (VIAGRA) 100 mg tablet Take 1 Tab by mouth as needed for Erectile Dysfunction.  aspirin delayed-release 81 mg tablet Take 1 Tab by mouth daily. No current facility-administered medications for this visit.        Past Medical History:   Diagnosis Date    Hypercholesteremia     Hypertension     Obesity        Past Surgical History:   Procedure Laterality Date    HX TONSILLECTOMY         Family History   Problem Relation Age of Onset    Kidney Disease Mother     Hypertension Mother     Lung Disease Father         lung cancer    Hypertension Maternal Grandmother     Diabetes Maternal Grandmother        Social History     Socioeconomic History    Marital status:      Spouse name: Not on file    Number of children: 1    Years of education: Not on file    Highest education level: Not on file   Occupational History    Occupation:  for [employer]     Employer: [employer]   Tobacco Use    Smoking status: Never Smoker    Smokeless tobacco: Former User     Types: Chew   Substance and Sexual Activity    Alcohol use: Yes     Comment: occasional-twice a month    Drug use: No    Sexual activity: Yes     Partners: Female   Other Topics Concern     Service Not Asked    Blood Transfusions Not Asked    Caffeine Concern No     Comment: 1 cup of coffee a day on weekdays    Occupational Exposure Not Asked    Hobby Hazards Not Asked    Sleep Concern Not Asked    Stress Concern Not Asked    Weight Concern No     Comment: has gained about 10 lbs the past year since not exercising as much    Special Diet No    Back Care Not Asked    Exercise Yes     Comment: just stays active w/ work   [employer] Not Asked   [address] [facility] Not Asked    Self-Exams Not Asked   Social History Narrative    Drives [facility]         Social Determinants of Health     Financial Resource Strain:     Difficulty of Paying Living Expenses: Not on file   Food Insecurity:     Worried About Running Out of Food in the Last Year: Not on file    Margi of Food in the Last Year: Not on file   Transportation Needs:     Lack of Transportation (Medical): Not on file    Lack of Transportation (Non-Medical): Not on file   Physical Activity:     Days of Exercise per Week: Not on file    Minutes of Exercise per Session: Not on file   Stress:     Feeling of Stress : Not on file   Social Connections:     Frequency of Communication with Friends and Family: Not on file    Frequency of Social Gatherings with Friends and Family: Not on file    Attends Latter day Services: Not on file    Active Member of [address] [place] [address] or Organizations: Not on file    Attends Club or Organization Meetings: Not on file    Marital Status: Not on file   Intimate Partner Violence:     Fear of Current or Ex-Partner: Not on file    Emotionally Abused: Not on file    Physically Abused: Not on file    Sexually Abused: Not on file   Housing Stability:     Unable to Pay for Housing in the Last Year: Not on file    Number of [place] in the Last Year: Not on file    Unstable Housing in the Last Year: Not on file       Review of Systems    No flowsheet data found. Vitals: There were no vitals taken for this visit. There is no height or weight on file to calculate BMI. An electronic signature was used to authenticate this note.   -- Inna Soto MD

## 2021-12-29 NOTE — LETTER
Date of appointment:  12/29/2021     Examining Physician: John Acuña MD      Employee information    Name:  Shana Zaragoza                                          YOB: 1965                               Medical record number: [id number]      Company information    Reason for visit: Follow Up    Body Part Injured: left elbow    Follow-up Requested: Three Months    Treatment: Anti-Inflammatory medication    Work Status Restrictions: May return to work with the following restrictions:  Other: Per FCE/IR      Signed: John Acuña MD

## 2022-03-19 PROBLEM — N52.9 ERECTILE DYSFUNCTION: Status: ACTIVE | Noted: 2019-08-26

## 2022-03-19 PROBLEM — R60.0 EDEMA OF BOTH LOWER EXTREMITIES: Status: ACTIVE | Noted: 2019-08-23

## 2022-03-20 PROBLEM — M77.12 LATERAL EPICONDYLITIS, LEFT ELBOW: Status: ACTIVE | Noted: 2021-12-28

## 2022-03-20 PROBLEM — R73.01 IMPAIRED FASTING GLUCOSE: Status: ACTIVE | Noted: 2017-06-09

## 2022-03-20 PROBLEM — R68.82 DECREASED LIBIDO: Status: ACTIVE | Noted: 2019-08-26

## 2022-05-08 NOTE — PROGRESS NOTES
ASSESSMENT/PLAN:  Below is the assessment and plan developed based on review of pertinent history, physical exam, labs, studies, and medications. 1. Lateral epicondylitis, left elbow      No follow-ups on file. In discussion with the patient, we considered the numerus possible diagnoses that could be contributing to their present symptoms. We also deliberated on the extensive management options that must be considered to treat their current condition. We reviewed their accessible prior medical records, diagnostic tests, and current health and employment information. We considered how these symptoms were affecting the patient´s activities of daily living as well as employment and fitness activities. The patient had various questions regarding the possible risks, benefits, complications, morbidity and mortality regarding their diagnosis and treatment options. The patients´ comorbidities were considered, and I advocated that they consider maximizing lifestyle modification through nutrition and exercise to aid in addressing their symptoms. Shared decision making yielded an understanding to move forward with conservation treatment preferences. The patient expressed understanding that if conservative management fails to alleviate the present symptoms they will return to office for re-evaluation and consideration of additional diagnostic tests and potential surgical options. In the interim, we have recommended ice, elevation, and take prescription anti-inflammatory medications along with a physician directed home exercise program. We discussed the risks and common side effects of anti-inflammatory medications and instructed the patient to discontinue the medication and contact us if they experienced any side effects. The patient was encouraged to discuss the possible side effects with their family physician or pharmacist prior to initiating any new medications.     We discussed the fact that many of the recommended treatment options presented are significantly limited by the patient´s social determinants of health. We also reviewed the circumstances surrounding the environment that they live and work which affect a wide range of health risk. We considered the limited access to appropriate educational resources regarding proper nutrition and exercise as well as the economic and social support necessary to maintain health and wellbeing. Given that the patient's symptoms are increasing in frequency and duration, we have decided to evaluate the etiology of the pain and loss of function with an MRI. We discussed the risks of an MRI which include, but are not limited to the enclosed space, noisy environment, magnetic effect on implanted metal. We also talked about the fact that MRI is also contraindicated in the presence of internal metallic objects such as bullets or shrapnel, as well as surgical clips, pins, plates, screws, metal sutures, or wire mesh. We talked about the fact that MRI does not use radiation, but it may be contrindicated if the patient has implanted pacemakers, intracranial aneurysm clips, cochlear implants, certain prosthetic devices, implanted drug infusion pumps, neurostimulators, bone-growth stimulators, certain intrauterine contraceptive devices; or any other type of iron-based metal implants. We discussed the fact that if you are pregnant or suspect that you may be pregnant, you should notify your physician and consult with your primary care or obstetrician before having an MRI. Although rare, we talked about the fact that if contrast dye is used, there is a risk for allergic reaction to the dye. Patients who are allergic to or sensitive to medications, contrast dye, iodine, or shellfish should notify the radiologist or technologist prior to the administration of dye.  MRI contrast may also influence other conditions such as allergies, asthma, anemia, hypotension (low blood pressure), and sickle cell disease. The patient has expressed understanding of these risks and I will see the patient back after the MRI to discuss the findings as well as the treatment options. We talked about the fact that I was concerned given the length of time he is continue to have pain despite conservative and operative management. We will allow him to continue to work with restrictions set forth by the FCE and impairment rating. I will see him back after his MRI. SUBJECTIVE/OBJECTIVE:  Amada Sanches (: 1965) is a 55 y.o. male, patient,here for evaluation of the No chief complaint on file. .   Mr. Jasper Amaral returns today for follow-up of his left shoulder and elbow. He initially underwent left elbow open lateral epicondylar release as well as medial epicondyle release on 10/22/2020. After a long recovery. He had an FCE rating on 2021 which revealed that he was able to function in the medium physical demand level and had a 5% upper extremity impairment with a 3% whole body impairment. He follows up today. He has been taking occasional Mobic. He denies any reinjury. Bindu has been looking for work. He reports that he continues to have discomfort in the left elbow. He is concerned that he will have trouble finding a job that requires any lifting. Physical Exam    Upon physical examination, the patient is well developed, well nourished, alert and oriented times three, with normal mood and affect and walks with a normal gait. Upon examination of the left elbow, the patient sits with normal posture. They are tender to palpation over the lateral epicondyle and extensor origin. The patient has full range of motion, but discomfort with resisted wrist extension and gripping. They have 5/5 strength, and are neurovascularly intact distally. There is no erythema, warmth or skin lesions present.     On examination of the contralateral extremity, the patient is nontender to palpation and has excellent range of motion, stability and strength. Imaging:    I have reviewed the patient´s previous diagnostic tests in an effort to support the diagnosis and treatment options. Allergies   Allergen Reactions    Ace Inhibitors Cough     ED       Current Outpatient Medications   Medication Sig    atorvastatin (LIPITOR) 40 mg tablet Take 1 tablet by mouth nightly    hydroCHLOROthiazide (HYDRODIURIL) 25 mg tablet TAKE 1 TABLET BY MOUTH ONCE DAILY, *NEEDS APPOINTMENT*    carvediloL (COREG) 25 mg tablet TAKE 1 TABLET BY MOUTH TWICE DAILY WITH MEALS    amLODIPine (NORVASC) 10 mg tablet Take 1 tablet by mouth once daily    spironolactone (ALDACTONE) 50 mg tablet Take 1 tablet by mouth once daily    losartan (COZAAR) 100 mg tablet Take 1 tablet by mouth once daily    sildenafil citrate (VIAGRA) 100 mg tablet Take 1 Tab by mouth as needed for Erectile Dysfunction.  aspirin delayed-release 81 mg tablet Take 1 Tab by mouth daily. No current facility-administered medications for this visit.        Past Medical History:   Diagnosis Date    Hypercholesteremia     Hypertension     Obesity        Past Surgical History:   Procedure Laterality Date    HX TONSILLECTOMY         Family History   Problem Relation Age of Onset    Kidney Disease Mother     Hypertension Mother     Lung Disease Father         lung cancer    Hypertension Maternal Grandmother     Diabetes Maternal Grandmother        Social History     Socioeconomic History    Marital status:      Spouse name: Not on file    Number of children: 1    Years of education: Not on file    Highest education level: Not on file   Occupational History    Occupation:  for [employer]     Employer: [employer]   Tobacco Use    Smoking status: Never Smoker    Smokeless tobacco: Former User     Types: Chew   Substance and Sexual Activity    Alcohol use: Yes     Comment: occasional-twice a month    Drug use: No    Sexual activity: Yes     Partners: Female   Other Topics Concern     Service Not Asked    Blood Transfusions Not Asked    Caffeine Concern No     Comment: 1 cup of coffee a day on weekdays    Occupational Exposure Not Asked    Hobby Hazards Not Asked    Sleep Concern Not Asked    Stress Concern Not Asked    Weight Concern No     Comment: has gained about 10 lbs the past year since not exercising as much    Special Diet No    Back Care Not Asked    Exercise Yes     Comment: just stays active w/ work   [employer] Not Asked   [address] [facility] Not Asked    Self-Exams Not Asked   Social History Narrative    Drives [employer]         Social Determinants of Health     Financial Resource Strain:     Difficulty of Paying Living Expenses: Not on file   Food Insecurity:     Worried About [address] [employer] in the Last Year: Not on file    Margi of Food in the Last Year: Not on file   Transportation Needs:     Lack of Transportation (Medical): Not on file    Lack of Transportation (Non-Medical):  Not on file   Physical Activity:     Days of Exercise per Week: Not on file    Minutes of Exercise per Session: Not on file   Stress:     Feeling of Stress : Not on file   Social Connections:     Frequency of Communication with Friends and Family: Not on file    Frequency of Social Gatherings with Friends and Family: Not on file    Attends Episcopal Services: Not on file    Active Member of [address] [place] [address] [employer] or Organizations: Not on file    Attends Club or Organization Meetings: Not on file    Marital Status: Not on file   Intimate Partner Violence:     Fear of Current or Ex-Partner: Not on file    Emotionally Abused: Not on file    Physically Abused: Not on file    Sexually Abused: Not on file   Housing Stability:     Unable to Pay for Housing in the Last Year: Not on file    Number of [place] in the Last Year: Not on file    Unstable Housing in the Last Year: Not on file       Review of Systems    No flowsheet data found. Vitals: There were no vitals taken for this visit. There is no height or weight on file to calculate BMI. An electronic signature was used to authenticate this note.   -- Emmie Wyatt MD

## 2022-05-09 ENCOUNTER — OFFICE VISIT (OUTPATIENT)
Dept: ORTHOPEDIC SURGERY | Age: 57
End: 2022-05-09
Payer: OTHER MISCELLANEOUS

## 2022-05-09 VITALS — HEIGHT: 67 IN | WEIGHT: 225 LBS | BODY MASS INDEX: 35.31 KG/M2

## 2022-05-09 DIAGNOSIS — M77.12 LATERAL EPICONDYLITIS, LEFT ELBOW: Primary | ICD-10-CM

## 2022-05-09 PROCEDURE — 99214 OFFICE O/P EST MOD 30 MIN: CPT | Performed by: ORTHOPAEDIC SURGERY

## 2022-05-09 NOTE — LETTER
NOTIFICATION RETURN TO WORK / SCHOOL    5/9/2022 8:50 AM    Mr. Ana Ayala  [address] [address]      To Whom It May Concern:    Ana Ayala is currently under the care of [facility]. He will be allowed to return to work with the limitations set forth in the functional capacity evaluation and impairment rating. We will see him back after the MRI of his elbow. If there are questions or concerns please have the patient contact our office.         Sincerely,      Lamine Bauman MD

## 2022-07-20 DIAGNOSIS — M77.12 LATERAL EPICONDYLITIS, LEFT ELBOW: Primary | ICD-10-CM

## 2022-08-15 NOTE — PROGRESS NOTES
ASSESSMENT/PLAN:  Below is the assessment and plan developed based on review of pertinent history, physical exam, labs, studies, and medications. 1. Lateral epicondylitis, left elbow      No follow-ups on file. In discussion with the patient, we considered the numerus possible diagnoses that could be contributing to their present symptoms. We also deliberated on the extensive management options that must be considered to treat their current condition. We reviewed their accessible prior medical records, diagnostic tests, and current health and employment information. We considered how these symptoms were affecting the patient´s activities of daily living as well as employment and fitness activities. The patient had various questions regarding the possible risks, benefits, complications, morbidity and mortality regarding their diagnosis and treatment options. The patients´ comorbidities were considered, and I advocated that they consider maximizing lifestyle modification through nutrition and exercise to aid in addressing their symptoms. Shared decision making yielded an understanding to move forward with conservation treatment preferences. The patient expressed understanding that if conservative management fails to alleviate the present symptoms they will return to office for re-evaluation and consideration of additional diagnostic tests and potential surgical options. In the interim, we have recommended ice, elevation, and take prescription anti-inflammatory medications along with a physician directed home exercise program. We discussed the risks and common side effects of anti-inflammatory medications and instructed the patient to discontinue the medication and contact us if they experienced any side effects. The patient was encouraged to discuss the possible side effects with their family physician or pharmacist prior to initiating any new medications.     We discussed the fact that many of the recommended treatment options presented are significantly limited by the patient´s social determinants of health. We also reviewed the circumstances surrounding the environment that they live and work which affect a wide range of health risk. We considered the limited access to appropriate educational resources regarding proper nutrition and exercise as well as the economic and social support necessary to maintain health and wellbeing. I had a long discussion with Mr. Jeanne Cleary regarding the fact that his MRI demonstrated that his tears of healed. We will allow him to return to work with restrictions set forth of his functional capacity evaluation and impairment rating. He is certainly reached maximal medical improvement at this time. I did offer him a second opinion on his elbow. I am happy to see him back in the future. SUBJECTIVE/OBJECTIVE:  Kenny Hernandez (: 1965) is a 55 y.o. male, patient,here for evaluation of the No chief complaint on file. .   Mr. Jeanne Cleary returns today for follow-up of his left shoulder and elbow. He initially underwent left elbow open lateral epicondylar release as well as medial epicondyle release on 10/22/2020. After a long recovery. He had an FCE rating on 2021 which revealed that he was able to function in the medium physical demand level and had a 5% upper extremity impairment with a 3% whole body impairment. He follows up today. He has been taking occasional Mobic. He denies any reinjury. Bindu has been looking for work. He reports that he continues to have discomfort in the left elbow. He is concerned that he will have trouble finding a job that requires any lifting. Physical Exam    Upon physical examination, the patient is well developed, well nourished, alert and oriented times three, with normal mood and affect and walks with a normal gait. Upon examination of the left elbow, the patient sits with normal posture.  They are tender to palpation over the lateral epicondyle and extensor origin. The patient has full range of motion, but discomfort with resisted wrist extension and gripping. They have 5/5 strength, and are neurovascularly intact distally. There is no erythema, warmth or skin lesions present. On examination of the contralateral extremity, the patient is nontender to palpation and has excellent range of motion, stability and strength. Imaging:    EXAM: MRI ELBOW LT WO CONT     INDICATION: Left elbow pain     COMPARISON: 7/1/2021     TECHNIQUE: Axial and coronal T1 and T2 fat-sat; sagittal T2 fat-sat and  gradient-echo MRI of the left elbow. CONTRAST: None. FINDINGS: Bone marrow: within normal limits. No fracture, dislocation, or marrow  replacing process. Joint fluid: Trace     Biceps, triceps, and brachialis tendons: intact. Common extensor and flexor tendons: Intact. Muscles: Within normal limits. Ulnar collateral ligament and radial collateral ligamentous complex: Intact. Ulnar nerve: Within normal limits. Articular cartilage: intact. No osteochondral lesion. Soft tissue mass: None. IMPRESSION     No evidence of internal derangement    Allergies   Allergen Reactions    Ace Inhibitors Cough     ED       Current Outpatient Medications   Medication Sig    atorvastatin (LIPITOR) 40 mg tablet Take 1 tablet by mouth nightly    hydroCHLOROthiazide (HYDRODIURIL) 25 mg tablet TAKE 1 TABLET BY MOUTH ONCE DAILY, *NEEDS APPOINTMENT*    carvediloL (COREG) 25 mg tablet TAKE 1 TABLET BY MOUTH TWICE DAILY WITH MEALS    amLODIPine (NORVASC) 10 mg tablet Take 1 tablet by mouth once daily    spironolactone (ALDACTONE) 50 mg tablet Take 1 tablet by mouth once daily    losartan (COZAAR) 100 mg tablet Take 1 tablet by mouth once daily    sildenafil citrate (VIAGRA) 100 mg tablet Take 1 Tab by mouth as needed for Erectile Dysfunction. aspirin delayed-release 81 mg tablet Take 1 Tab by mouth daily.      No current facility-administered medications for this visit. Past Medical History:   Diagnosis Date    Hypercholesteremia     Hypertension     Obesity        Past Surgical History:   Procedure Laterality Date    HX TONSILLECTOMY         Family History   Problem Relation Age of Onset    Kidney Disease Mother     Hypertension Mother     Lung Disease Father         lung cancer    Hypertension Maternal Grandmother     Diabetes Maternal Grandmother        Social History     Socioeconomic History    Marital status:      Spouse name: Not on file    Number of children: 1    Years of education: Not on file    Highest education level: Not on file   Occupational History    Occupation:  for [employer]     Employer: [employer]   Tobacco Use    Smoking status: Never Smoker    Smokeless tobacco: Former User     Types: Chew   Substance and Sexual Activity    Alcohol use: Yes     Comment: occasional-twice a month    Drug use: No    Sexual activity: Yes     Partners: Female   Other Topics Concern     Service Not Asked    Blood Transfusions Not Asked    Caffeine Concern No     Comment: 1 cup of coffee a day on weekdays    Occupational Exposure Not Asked    Hobby Hazards Not Asked    Sleep Concern Not Asked    Stress Concern Not Asked    Weight Concern No     Comment: has gained about 10 lbs the past year since not exercising as much    Special Diet No    Back Care Not Asked    Exercise Yes     Comment: just stays active w/ work    [employer] Helmet Not Asked    Seat Belt Not Asked    Self-Exams Not Asked   Social History Narrative    Drives [employer]         Social Determinants of Health     Financial Resource Strain:     Difficulty of Paying Living Expenses: Not on file   Food Insecurity:     Worried About [address] in the Last Year: Not on file    Margi of Food in the Last Year: Not on file   Transportation Needs:     Lack of Transportation (Medical):  Not on file    Lack of Transportation (Non-Medical): Not on file   Physical Activity:     Days of Exercise per Week: Not on file    Minutes of Exercise per Session: Not on file   Stress:     Feeling of Stress : Not on file   Social Connections:     Frequency of Communication with Friends and Family: Not on file    Frequency of Social Gatherings with Friends and Family: Not on file    Attends Taoist Services: Not on file    Active Member of Clubs or Organizations: Not on file    Attends Club or Organization Meetings: Not on file    Marital Status: Not on file   Intimate Partner Violence:     Fear of Current or Ex-Partner: Not on file    Emotionally Abused: Not on file    Physically Abused: Not on file    Sexually Abused: Not on file   Housing Stability:     Unable to Pay for Housing in the Last Year: Not on file    Number of [place] in the Last Year: Not on file    Unstable Housing in the Last Year: Not on file       Review of Systems    No flowsheet data found. Vitals: There were no vitals taken for this visit. There is no height or weight on file to calculate BMI. An electronic signature was used to authenticate this note.   -- Caren Phelps MD

## 2022-08-16 ENCOUNTER — OFFICE VISIT (OUTPATIENT)
Dept: ORTHOPEDIC SURGERY | Age: 57
End: 2022-08-16
Payer: OTHER MISCELLANEOUS

## 2022-08-16 VITALS — HEIGHT: 67 IN | BODY MASS INDEX: 35.31 KG/M2 | WEIGHT: 225 LBS

## 2022-08-16 DIAGNOSIS — M77.12 LATERAL EPICONDYLITIS, LEFT ELBOW: Primary | ICD-10-CM

## 2022-08-16 PROCEDURE — 99214 OFFICE O/P EST MOD 30 MIN: CPT | Performed by: ORTHOPAEDIC SURGERY

## 2022-08-16 RX ORDER — CARVEDILOL 6.25 MG/1
6.25 TABLET ORAL 2 TIMES DAILY
COMMUNITY
Start: 2022-07-26

## 2022-08-16 NOTE — LETTER
NOTIFICATION RETURN TO WORK / SCHOOL    8/16/2022 12:25 PM    Mr. Tolu Vera  [address] [address]      To Whom It May Concern:    oTlu Vera is currently under the care of [facility]. He may return to work per his FCE/IR. Patient has reached MMI. If there are questions or concerns please have the patient contact our office.         Sincerely,          Pedro Leos MD

## 2023-07-24 ENCOUNTER — TELEPHONE (OUTPATIENT)
Age: 58
End: 2023-07-24